# Patient Record
Sex: MALE | Race: BLACK OR AFRICAN AMERICAN | NOT HISPANIC OR LATINO | Employment: FULL TIME | URBAN - METROPOLITAN AREA
[De-identification: names, ages, dates, MRNs, and addresses within clinical notes are randomized per-mention and may not be internally consistent; named-entity substitution may affect disease eponyms.]

---

## 2017-02-06 ENCOUNTER — HOSPITAL ENCOUNTER (OUTPATIENT)
Dept: RADIOLOGY | Facility: HOSPITAL | Age: 57
Discharge: HOME | End: 2017-02-06
Payer: COMMERCIAL

## 2017-02-06 ENCOUNTER — TRANSCRIBE ORDERS (OUTPATIENT)
Dept: ADMINISTRATIVE | Facility: HOSPITAL | Age: 57
End: 2017-02-06

## 2017-02-06 DIAGNOSIS — I50.22 CHRONIC SYSTOLIC HEART FAILURE (HCC): Primary | ICD-10-CM

## 2017-02-06 PROCEDURE — 71020 HB CHEST X-RAY 2VW FRONTAL&LATL: CPT

## 2019-04-18 LAB — HCV AB SER-ACNC: NONREACTIVE

## 2022-01-17 ENCOUNTER — OFFICE VISIT (OUTPATIENT)
Dept: FAMILY MEDICINE CLINIC | Facility: CLINIC | Age: 62
End: 2022-01-17
Payer: COMMERCIAL

## 2022-01-17 VITALS
HEIGHT: 72 IN | WEIGHT: 194.4 LBS | TEMPERATURE: 97.7 F | BODY MASS INDEX: 26.33 KG/M2 | HEART RATE: 73 BPM | RESPIRATION RATE: 12 BRPM | DIASTOLIC BLOOD PRESSURE: 86 MMHG | OXYGEN SATURATION: 100 % | SYSTOLIC BLOOD PRESSURE: 120 MMHG

## 2022-01-17 DIAGNOSIS — Z13.1 SCREENING FOR DIABETES MELLITUS: ICD-10-CM

## 2022-01-17 DIAGNOSIS — R53.83 FATIGUE, UNSPECIFIED TYPE: ICD-10-CM

## 2022-01-17 DIAGNOSIS — N52.9 ERECTILE DYSFUNCTION, UNSPECIFIED ERECTILE DYSFUNCTION TYPE: ICD-10-CM

## 2022-01-17 DIAGNOSIS — Z12.12 SCREENING FOR COLORECTAL CANCER: ICD-10-CM

## 2022-01-17 DIAGNOSIS — J45.20 MILD INTERMITTENT ASTHMA WITHOUT COMPLICATION: ICD-10-CM

## 2022-01-17 DIAGNOSIS — Z12.11 SCREENING FOR COLORECTAL CANCER: ICD-10-CM

## 2022-01-17 DIAGNOSIS — Z13.6 SCREENING FOR CARDIOVASCULAR CONDITION: ICD-10-CM

## 2022-01-17 DIAGNOSIS — Z00.00 ANNUAL PHYSICAL EXAM: Primary | ICD-10-CM

## 2022-01-17 DIAGNOSIS — I50.9 CHRONIC CONGESTIVE HEART FAILURE, UNSPECIFIED HEART FAILURE TYPE (HCC): ICD-10-CM

## 2022-01-17 PROCEDURE — 3008F BODY MASS INDEX DOCD: CPT | Performed by: FAMILY MEDICINE

## 2022-01-17 PROCEDURE — 3725F SCREEN DEPRESSION PERFORMED: CPT | Performed by: FAMILY MEDICINE

## 2022-01-17 PROCEDURE — 99386 PREV VISIT NEW AGE 40-64: CPT | Performed by: FAMILY MEDICINE

## 2022-01-17 PROCEDURE — 1036F TOBACCO NON-USER: CPT | Performed by: FAMILY MEDICINE

## 2022-01-17 RX ORDER — GLUCOSAMINE HCL 500 MG
200 TABLET ORAL
COMMUNITY

## 2022-01-17 RX ORDER — CARVEDILOL 12.5 MG/1
TABLET ORAL 2 TIMES DAILY
COMMUNITY

## 2022-01-17 RX ORDER — TADALAFIL 20 MG/1
TABLET ORAL
COMMUNITY
Start: 2022-01-08

## 2022-01-17 RX ORDER — ASCORBIC ACID 500 MG
500 TABLET ORAL DAILY
COMMUNITY

## 2022-01-17 RX ORDER — SPIRONOLACTONE 25 MG/1
25 TABLET ORAL DAILY
COMMUNITY

## 2022-01-17 RX ORDER — DAPAGLIFLOZIN 10 MG/1
10 TABLET, FILM COATED ORAL DAILY
COMMUNITY

## 2022-01-17 RX ORDER — AMOXICILLIN 500 MG
1200 CAPSULE ORAL DAILY
COMMUNITY

## 2022-01-17 NOTE — PATIENT INSTRUCTIONS

## 2022-01-17 NOTE — PROGRESS NOTES
[address] [facility]    NAME: Burnis Telles  AGE: 64 y o  SEX: male  : 1960     DATE: 2022     Assessment and Plan:     Problem List Items Addressed This Visit     None      Visit Diagnoses     Annual physical exam    -  Primary    María Garcia appears well  He is to continue a lower carb/salt diet, and regular exercise  FBW ordered  Relevant Orders    TSH, 3rd generation with Free T4 reflex    CBC and differential    Screening for colorectal cancer        Pt referred to GI  Relevant Orders    Ambulatory Referral to Gastroenterology    Screening for diabetes mellitus        Relevant Orders    Comprehensive metabolic panel    Screening for cardiovascular condition        Relevant Orders    Lipid Panel with Direct LDL reflex    Fatigue, unspecified type        Relevant Orders    TSH, 3rd generation with Free T4 reflex    CBC and differential    Chronic congestive heart failure, unspecified heart failure type ([address] )        Hx of; stable on present management - appears euvolemic  Continues f/u with Cardiology as well  Relevant Medications    tadalafil (CIALIS) 20 MG tablet    carvedilol (COREG) 12 5 mg tablet    Mild intermittent asthma without complication        Stable  Has a PRN Albuterol HFA at home - rarely uses  Erectile dysfunction, unspecified erectile dysfunction type        Chronic, stable - continues f/u with Urology  Continues prostate cancer screening with them as well  Immunizations and preventive care screenings were discussed with patient today  Appropriate education was printed on patient's after visit summary  Counseling:  Dental Health: discussed importance of regular tooth brushing, flossing, and dental visits  · Exercise: the importance of regular exercise/physical activity was discussed  Recommend exercise 3-5 times per week for at least 30 minutes  BMI Counseling:  Body mass index is 26 37 kg/m²  The BMI is above normal  Nutrition recommendations include moderation in carbohydrate intake  Exercise recommendations include exercising 3-5 times per week  No pharmacotherapy was ordered  Patient referred to PCP  Rationale for BMI follow-up plan is due to patient being overweight or obese  Depression Screening and Follow-up Plan: Patient was screened for depression during today's encounter  They screened negative with a PHQ-2 score of 0  Return in 6 months (on 7/17/2022) for Recheck  Chief Complaint:     Chief Complaint   Patient presents with    Physical Exam     patient being seen for physical       History of Present Illness:     Adult Annual Physical   Patient here for a comprehensive physical exam  The patient reports no problems  Virgil Gallego presents today as a New Patient - his wife, John Goyal is also a pt of [employer]  Pt is followed by Cardiologist (CHF patient; hx of MV Clip) and Urology in in [place], [place]  Works for the [employer]  He is a non-smoker  Pt completed the full Moderna vaccine series (incl booster)  Declines the the Flu Vaccine  No hx of polyps, IBD; no family hx of colon cancer  Diet and Physical Activity  · Diet/Nutrition: well balanced diet  · Exercise: no formal exercise  Very active person  Depression Screening  PHQ-2/9 Depression Screening    Little interest or pleasure in doing things: 0 - not at all  Feeling down, depressed, or hopeless: 0 - not at all  PHQ-2 Score: 0  PHQ-2 Interpretation: Negative depression screen       General Health  · Sleep: sleeps well  · Hearing: normal - bilateral   · Vision: goes for regular eye exams  · Dental: no dental visits for >1 year  We discussed the importance of this   Health  · Symptoms include: none     Review of Systems:     Review of Systems   Constitutional: Negative for activity change  Respiratory: Negative for shortness of breath           Hx of mild, chronic LOPEZ  Cardiovascular: Negative for chest pain  Gastrointestinal: Negative for abdominal pain and blood in stool  Genitourinary: Negative for decreased urine volume and difficulty urinating  Hx of ED  Psychiatric/Behavioral: Negative for dysphoric mood  The patient is not nervous/anxious  Past Medical History:     History reviewed  No pertinent past medical history  Past Surgical History:     History reviewed  No pertinent surgical history  Family History:     History reviewed  No pertinent family history  Social History:     Social History     Socioeconomic History    Marital status: /Civil Union     Spouse name: None    Number of children: None    Years of education: None    Highest education level: None   Occupational History    None   Tobacco Use    Smoking status: Never Smoker    Smokeless tobacco: Never Used   Substance and Sexual Activity    Alcohol use:  Yes    Drug use: Not Currently    Sexual activity: None   Other Topics Concern    None   Social History Narrative    None     Social Determinants of Health     Financial Resource Strain: Not on file   Food Insecurity: Not on file   Transportation Needs: Not on file   Physical Activity: Not on file   Stress: Not on file   Social Connections: Not on file   Intimate Partner Violence: Not on file   Housing Stability: Not on file      Current Medications:     Current Outpatient Medications   Medication Sig Dispense Refill    apixaban (ELIQUIS) 5 mg Take 5 mg by mouth 2 (two) times a day      ascorbic acid (VITAMIN C) 500 MG tablet Take 500 mg by mouth daily      carvedilol (COREG) 12 5 mg tablet 2 (two) times a day      Coenzyme Q10 100 MG TABS Take 200 mg by mouth      Dapagliflozin Propanediol (Farxiga) 10 MG TABS Take 10 mg by mouth daily      multivitamin-minerals (CENTRUM) tablet Take 1 tablet by mouth daily      Omega-3 Fatty Acids (Fish Oil) 1200 MG CAPS Take 1,200 mg by mouth daily      rivaroxaban (Xarelto) 20 mg tablet Take 20 mg by mouth      spironolactone (ALDACTONE) 25 mg tablet Take 25 mg by mouth daily      tadalafil (CIALIS) 20 MG tablet TAKE 1 TABLET BY MOUTH IF NEEDED FOR ERECTILE DYSFUNCTION PRIOR TO SEX       No current facility-administered medications for this visit  Allergies:     No Known Allergies   Physical Exam:     /86 (BP Location: Left arm, Patient Position: Sitting, Cuff Size: Standard)   Pulse 73   Temp 97 7 °F (36 5 °C) (Tympanic)   Resp 12   Ht 6' (1 829 m)   Wt 88 2 kg (194 lb 6 4 oz)   SpO2 100%   BMI 26 37 kg/m²     Physical Exam  Vitals and nursing note reviewed  Constitutional:       General: He is not in acute distress  Appearance: Normal appearance  He is not ill-appearing, toxic-appearing or diaphoretic  HENT:      Head: Normocephalic and atraumatic  Eyes:      General: No scleral icterus  Conjunctiva/sclera: Conjunctivae normal    Neck:      Vascular: No JVD  Cardiovascular:      Rate and Rhythm: Normal rate and regular rhythm  Heart sounds: Normal heart sounds  No murmur heard  No friction rub  No gallop  Pulmonary:      Effort: Pulmonary effort is normal  No respiratory distress  Breath sounds: Normal breath sounds  No stridor  No wheezing, rhonchi or rales  Abdominal:      General: Abdomen is flat  Bowel sounds are normal  There is no distension  Palpations: Abdomen is soft  There is no mass  Tenderness: There is no abdominal tenderness  There is no guarding or rebound  Musculoskeletal:      Cervical back: Normal range of motion and neck supple  No rigidity or tenderness  Right lower leg: No edema  Left lower leg: No edema  Lymphadenopathy:      Cervical: No cervical adenopathy  Neurological:      Mental Status: He is alert and oriented to person, place, and time  Psychiatric:         Mood and Affect: Mood normal          Behavior: Behavior normal          Thought Content:  Thought content normal          Judgment: Judgment normal       Murray Carrel was seen today for physical exam     Diagnoses and all orders for this visit:    Annual physical exam  Comments:  Kristen Red appears well  He is to continue a lower carb/salt diet, and regular exercise  FBW ordered  Orders:  -     TSH, 3rd generation with Free T4 reflex; Future  -     CBC and differential; Future    Screening for colorectal cancer  Comments:  Pt referred to GI  Orders:  -     Ambulatory Referral to Gastroenterology; Future    Screening for diabetes mellitus  -     Comprehensive metabolic panel; Future    Screening for cardiovascular condition  -     Lipid Panel with Direct LDL reflex; Future    Fatigue, unspecified type  -     TSH, 3rd generation with Free T4 reflex; Future  -     CBC and differential; Future    Chronic congestive heart failure, unspecified heart failure type ([facility] [address] )  Comments:  Hx of; stable on present management - appears euvolemic  Continues f/u with Cardiology as well  Mild intermittent asthma without complication  Comments:  Stable  Has a PRN Albuterol HFA at home - rarely uses  Erectile dysfunction, unspecified erectile dysfunction type  Comments:  Chronic, stable - continues f/u with Urology  Continues prostate cancer screening with them as well            [address] [place], [address] [facility]

## 2022-05-26 ENCOUNTER — NURSE TRIAGE (OUTPATIENT)
Dept: OTHER | Facility: OTHER | Age: 62
End: 2022-05-26

## 2022-05-26 NOTE — TELEPHONE ENCOUNTER
Called the patient to verify virtual and needed the number but had to leave a message to call me back first thing in the am

## 2022-05-26 NOTE — TELEPHONE ENCOUNTER
Patient called in to report he tested positive for Covid today; reporting history of Asthma, CHF, Pacemaker/ICD  Using inhaler and has nebulizer  Reports shortness of breath while resting and wheezing  No speaking difficulty during triage call noted  Triage RN advised patient go to Urgent Care or ED; patient reports he will wait for virtual visit with MAAMENP at 11:30 AM 5/27/22  Will seek immediate care if symptoms worsen this evening  Please follow up with patient prior to visit to know how to connect with provider  Reason for Disposition   MILD difficulty breathing (e g , minimal/no SOB at rest, SOB with walking, pulse <100)    Answer Assessment - Initial Assessment Questions  Were you within 6 feet or less, for up to 15 minutes or more with a person that has a confirmed COVID-19 test? Yes, friend on 5/22/22    What was the date of your exposure? Home test positive 5/26/22    Are you experiencing any symptoms attributed to the virus?  (Assess for SOB, cough, fever, difficulty breathing) Cough, sore throat, runny nose, fever 99 8F, fatigue,     HIGH RISK: Do you have any history heart or lung conditions, weakened immune system, diabetes, Asthma, CHF, HIV, COPD, Chemo, renal failure, sickle cell, etc? CHF, Pacemaker/ICD; Asthma with inhaler PRN; shortness of breath while resting and wheezing    PREGNANCY: Are you pregnant or did you recently give birth?  N/a    VACCINE: "Have you gotten the COVID-19 vaccine?" If Yes ask: "Which one, how many shots, when did you get it?" Yes, Moderna x 2 shots plus booster    Protocols used: CORONAVIRUS (COVID-19) DIAGNOSED OR SUSPECTED-[facility]

## 2022-05-26 NOTE — TELEPHONE ENCOUNTER
Regarding: covid positive med request   ----- Message from Sirisha Ellsworth sent at 5/26/2022  4:14 PM EDT -----  " I tested positive for covid and Im wondering if I can be prescribed the covid medication ?"

## 2022-05-27 ENCOUNTER — HOSPITAL ENCOUNTER (OUTPATIENT)
Dept: INFUSION CENTER | Facility: HOSPITAL | Age: 62
Discharge: HOME | End: 2022-05-27
Payer: COMMERCIAL

## 2022-05-27 ENCOUNTER — TELEPHONE (OUTPATIENT)
Dept: OTHER | Facility: OTHER | Age: 62
End: 2022-05-27

## 2022-05-27 ENCOUNTER — TELEMEDICINE (OUTPATIENT)
Dept: FAMILY MEDICINE CLINIC | Facility: CLINIC | Age: 62
End: 2022-05-27
Payer: COMMERCIAL

## 2022-05-27 VITALS
HEART RATE: 75 BPM | RESPIRATION RATE: 18 BRPM | SYSTOLIC BLOOD PRESSURE: 119 MMHG | TEMPERATURE: 100.8 F | OXYGEN SATURATION: 98 % | DIASTOLIC BLOOD PRESSURE: 73 MMHG

## 2022-05-27 VITALS — WEIGHT: 190.48 LBS | HEIGHT: 75 IN | TEMPERATURE: 97.7 F | BODY MASS INDEX: 23.68 KG/M2

## 2022-05-27 DIAGNOSIS — J45.20 MILD INTERMITTENT ASTHMA WITHOUT COMPLICATION: Primary | ICD-10-CM

## 2022-05-27 DIAGNOSIS — J45.20 MILD INTERMITTENT ASTHMA WITHOUT COMPLICATION: ICD-10-CM

## 2022-05-27 DIAGNOSIS — U07.1 COVID-19: Primary | ICD-10-CM

## 2022-05-27 DIAGNOSIS — I10 HYPERTENSION, UNSPECIFIED TYPE: ICD-10-CM

## 2022-05-27 DIAGNOSIS — I50.9 CONGESTIVE HEART FAILURE, UNSPECIFIED HF CHRONICITY, UNSPECIFIED HEART FAILURE TYPE (HCC): ICD-10-CM

## 2022-05-27 PROBLEM — Z95.810 ICD (IMPLANTABLE CARDIOVERTER-DEFIBRILLATOR), BIVENTRICULAR, IN SITU: Status: ACTIVE | Noted: 2020-01-22

## 2022-05-27 PROBLEM — I34.0 SEVERE MITRAL REGURGITATION: Status: ACTIVE | Noted: 2020-01-22

## 2022-05-27 PROCEDURE — M0222 HB BEBTELOVIMAB INJECTION: HCPCS | Performed by: FAMILY MEDICINE

## 2022-05-27 PROCEDURE — 99213 OFFICE O/P EST LOW 20 MIN: CPT | Performed by: NURSE PRACTITIONER

## 2022-05-27 RX ORDER — LEVALBUTEROL TARTRATE 45 UG/1
1-2 AEROSOL, METERED ORAL EVERY 4 HOURS PRN
Qty: 15 G | Refills: 3 | Status: SHIPPED | OUTPATIENT
Start: 2022-05-27

## 2022-05-27 RX ORDER — ONDANSETRON 2 MG/ML
4 INJECTION INTRAMUSCULAR; INTRAVENOUS ONCE AS NEEDED
Status: CANCELLED | OUTPATIENT
Start: 2022-05-27

## 2022-05-27 RX ORDER — ALBUTEROL SULFATE 90 UG/1
3 AEROSOL, METERED RESPIRATORY (INHALATION) ONCE AS NEEDED
Status: CANCELLED | OUTPATIENT
Start: 2022-05-27

## 2022-05-27 RX ORDER — ACETAMINOPHEN 325 MG/1
650 TABLET ORAL ONCE AS NEEDED
Status: CANCELLED | OUTPATIENT
Start: 2022-05-27

## 2022-05-27 RX ORDER — BEBTELOVIMAB 87.5 MG/ML
175 INJECTION, SOLUTION INTRAVENOUS ONCE
Status: COMPLETED | OUTPATIENT
Start: 2022-05-27 | End: 2022-05-27

## 2022-05-27 RX ORDER — ALBUTEROL SULFATE 90 UG/1
3 AEROSOL, METERED RESPIRATORY (INHALATION) ONCE AS NEEDED
Status: DISCONTINUED | OUTPATIENT
Start: 2022-05-27 | End: 2022-05-30 | Stop reason: HOSPADM

## 2022-05-27 RX ORDER — SODIUM CHLORIDE 9 MG/ML
20 INJECTION, SOLUTION INTRAVENOUS ONCE
Status: CANCELLED | OUTPATIENT
Start: 2022-05-27

## 2022-05-27 RX ORDER — ACETAMINOPHEN 325 MG/1
650 TABLET ORAL ONCE AS NEEDED
Status: DISCONTINUED | OUTPATIENT
Start: 2022-05-27 | End: 2022-05-30 | Stop reason: HOSPADM

## 2022-05-27 RX ORDER — BEBTELOVIMAB 87.5 MG/ML
175 INJECTION, SOLUTION INTRAVENOUS ONCE
Status: CANCELLED | OUTPATIENT
Start: 2022-05-27

## 2022-05-27 RX ORDER — SODIUM CHLORIDE 9 MG/ML
20 INJECTION, SOLUTION INTRAVENOUS ONCE
Status: DISCONTINUED | OUTPATIENT
Start: 2022-05-27 | End: 2022-05-30 | Stop reason: HOSPADM

## 2022-05-27 RX ORDER — ONDANSETRON 2 MG/ML
4 INJECTION INTRAMUSCULAR; INTRAVENOUS ONCE AS NEEDED
Status: DISCONTINUED | OUTPATIENT
Start: 2022-05-27 | End: 2022-05-30 | Stop reason: HOSPADM

## 2022-05-27 RX ADMIN — BEBTELOVIMAB 175 MG: 87.5 INJECTION, SOLUTION INTRAVENOUS at 14:01

## 2022-05-27 RX ADMIN — ACETAMINOPHEN 650 MG: 325 TABLET, FILM COATED ORAL at 14:58

## 2022-05-27 NOTE — NURSING NOTE
Pt questioning make up of bebtelovimab, concerned if any blood products in make up  Calls placed to pharmacy and , ge, awaiting answers  Barcode for information also scanned by pt and rn

## 2022-05-27 NOTE — PROGRESS NOTES
COVID-19 Outpatient Progress Note    Assessment/Plan:    Problem List Items Addressed This Visit        Respiratory    Asthma    Relevant Medications    levalbuterol (Xopenex HFA) 45 mcg/act inhaler       Cardiovascular and Mediastinum    Hypertension    Congestive heart failure ([address] )       Other    COVID-19 - Primary       COVID-19 Home Care Guidelines    Your healthcare provider and/or public health staff have evaluated you and have determined that you do not need to remain in the hospital at this time  At this time you can be isolated at home where you will be monitored by staff from your local or state health department  You should carefully follow the prevention and isolation steps below until a healthcare provider or local or state health department says that you can return to your normal activities  Stay home except to get medical care    People who are mildly ill with COVID-19 are able to isolate at home during their illness  You should restrict activities outside your home, except for getting medical care  Do not go to work, school, or public areas  Avoid using public transportation, ride-sharing, or taxis  Separate yourself from other people and animals in your home    People: As much as possible, you should stay in a specific room and away from other people in your home  Also, you should use a separate bathroom, if available  Animals: You should restrict contact with pets and other animals while you are sick with COVID-19, just like you would around other people  Although there have not been reports of pets or other animals becoming sick with COVID-19, it is still recommended that people sick with COVID-19 limit contact with animals until more information is known about the virus  When possible, have another member of your household care for your animals while you are sick   If you are sick with COVID-19, avoid contact with your pet, including petting, snuggling, being kissed or licked, and sharing food  If you must care for your pet or be around animals while you are sick, wash your hands before and after you interact with pets and wear a facemask  See COVID-19 and Animals for more information  Call ahead before visiting your doctor    If you have a medical appointment, call the healthcare provider and tell them that you have or may have COVID-19  This will help the healthcare providers office take steps to keep other people from getting infected or exposed  Wear a facemask    You should wear a facemask when you are around other people (e g , sharing a room or vehicle) or pets and before you enter a healthcare providers office  If you are not able to wear a facemask (for example, because it causes trouble breathing), then people who live with you should not stay in the same room with you, or they should wear a facemask if they enter your room  Cover your coughs and sneezes    Cover your mouth and nose with a tissue when you cough or sneeze  Throw used tissues in a lined trash can  Immediately wash your hands with soap and water for at least 20 seconds or, if soap and water are not available, clean your hands with an alcohol-based hand  that contains at least 60% alcohol  Clean your hands often    Wash your hands often with soap and water for at least 20 seconds, especially after blowing your nose, coughing, or sneezing; going to the bathroom; and before eating or preparing food  If soap and water are not readily available, use an alcohol-based hand  with at least 60% alcohol, covering all surfaces of your hands and rubbing them together until they feel dry  Soap and water are the best option if hands are visibly dirty  Avoid touching your eyes, nose, and mouth with unwashed hands  Avoid sharing personal household items    You should not share dishes, drinking glasses, cups, eating utensils, towels, or bedding with other people or pets in your home   After using these items, they should be washed thoroughly with soap and water  Clean all high-touch surfaces everyday    High touch surfaces include counters, tabletops, doorknobs, bathroom fixtures, toilets, phones, keyboards, tablets, and bedside tables  Also, clean any surfaces that may have blood, stool, or body fluids on them  Use a household cleaning spray or wipe, according to the label instructions  Labels contain instructions for safe and effective use of the cleaning product including precautions you should take when applying the product, such as wearing gloves and making sure you have good ventilation during use of the product  Monitor your symptoms    Seek prompt medical attention if your illness is worsening (e g , difficulty breathing)  Before seeking care, call your healthcare provider and tell them that you have, or are being evaluated for, COVID-19  Put on a facemask before you enter the facility  These steps will help the healthcare providers office to keep other people in the office or waiting room from getting infected or exposed  Ask your healthcare provider to call the local or [facility] health department  Persons who are placed under active monitoring or facilitated self-monitoring should follow instructions provided by their local health department or occupational health professionals, as appropriate  If you have a medical emergency and need to call 911, notify the dispatch personnel that you have, or are being evaluated for COVID-19  If possible, put on a facemask before emergency medical services arrive  Discontinuing home isolation    Patients with confirmed COVID-19 should remain under home isolation precautions until the following conditions are met:    They have had no fever for at least 24 hours (that is one full day of no fever without the use medicine that reduces fevers)  AND  other symptoms have improved (for example, when their cough or shortness of breath have improved)  AND  If had mild or moderate illness, at least 10 days have passed since their symptoms first appeared or if severe illness (needed oxygen) or immunosuppressed, at least 20 days have passed since symptoms first appeared  Patients with confirmed COVID-19 should also notify close contacts (including their workplace) and ask that they self-quarantine  Currently, close contact is defined as being within 6 feet for 15 minutes or more from the period 24 hours starting 48 hours before symptom onset to the time at which the patient went into isolation  Close contacts of patients diagnosed with COVID-19 should be instructed by the patient to self-quarantine for 14 days from the last time of their last contact with the patient  Source: [employer]  Disposition:     Patient is not fully vaccinated and I recommended self quarantine for 5 days followed by strict mask use for an additional 5 days  If patient were to develop symptoms, they should immediately self isolate and call our office for further guidance  Discussed vitamin D, vitamin C, and/or zinc supplementation with patient  Patient meets criteria for Bebtelovimab infusion  They were counseled in regards to risks, benefits, and side effects of this infusion  Corrinne Jaime is an investigational medicine used to treat mild-to-moderate symptoms of COVID-19 in adults and children (15years of age and older weighing at least 80 pounds (40 kg)) with positive results of direct SARS-CoV-2 viral testing, and who are at high risk of progression to severe COVID-19, including hospitalization or death, and for whom other COVID-19 treatment options approved or authorized by FDA are not available or clinically appropriate  Bebtelovimab is investigational because it is still being studied   There is limited information about the safety and effectiveness of using bebtelovimab to treat people with mild-to-moderate COVID-19  The FDA has authorized the emergency use of bebtelovimab for the treatment of COVID-19 under an Emergency Use Authorization (EUA)  Ezzie Camejo is not authorized for use in people who:  - are likely to be infected with a SARS-CoV-2 variant that is not able to be treated by bebtelovimab based on the circulating variants in your area (ask your health care provider about FDA and CDCs latest information on circulating variants by geographic area), or  - are hospitalized due to COVID-19, or  - require oxygen therapy and/or respiratory support due to COVID-19, or  - require an increase in baseline oxygen flow rate and/or respiratory support due to [address] and are on chronic oxygen therapy and/or respiratory support due to underlying nonCOVID-19 related comorbidity  How will I receive Bebtelovimab? Ezzie Camejo will be given as an injection through a vein (intravenously or IV) over at least 30 seconds  You will be observed by your healthcare provider for at least 1 hour after you receive bebtelovimab  Possible side effects of Bebtelovimab: Allergic reactions can happen during and after infusion with bebtelovimab  Possible reactions include: fever, chills, nausea, headache, shortness of breath, low or high blood pressure, rapid or slow heart rate, chest discomfort or pain, weakness, confusion, feeling tired, wheezing, swelling of your lips, face, or throat, rash including hives, itching, muscle aches, dizziness, and sweating  These reactions may be severe or life threatening  Worsening symptoms after treatment: You may experience new or worsening symptoms after infusion, including fever, difficulty breathing, rapid or slow heart rate, tiredness, weakness or confusion  If these occur, contact your healthcare provider or seek immediate medical attention as some of these events have required hospitalization   It is unknown if these events are related to treatment or are due to the progression of COVID19  The side effects of getting any medicine by vein may include brief pain, bleeding, bruising of the skin, soreness, swelling, and possible infection at the infusion site  These are not all the possible side effects of bebtelovimab  Not a lot of people have been given bebtelovimab  Serious and unexpected side effects may happen  Bebtelovimab are still being studied so it is possible that all of the risks are not known at this time  It is possible that bebtelovimab could interfere with your body's own ability to fight off a future infection of SARS-CoV-2  Similarly, bebtelovimab may reduce your bodys immune response to a vaccine for SARS-CoV-2  Specific studies have not been conducted to address these possible risks  Talk to your healthcare provider if you have any questions  Emergency Use Authorization:    The [facility] FDA has made bebtelovimab available under an emergency access mechanism called an EUA  The EUA is supported by a  of Health and Human Service ([facility]) declaration that circumstances exist to justify the emergency use of drugs and biological products during the COVID-19 pandemic  Bebtelovimab have not undergone the same type of review as an FDA-approved or cleared product  The FDA may issue an EUA when certain criteria are met, which includes that there are no adequate, approved, and available alternatives  In addition, the FDA decision is based on the totality of scientific evidence available showing that it is reasonable to believe that the product meets certain criteria for safety, performance, and labeling and may be effective in treatment of patients during the COVID-19 pandemic  All of these criteria must be met to allow for the product to be used in the treatment of patients during the COVID-19 pandemic      The EUA for bebtelovimab together is in effect for the duration of the COVID-19 declaration justifying emergency use of these products, unless terminated or revoked (after which the product may no longer be used)  What if I am pregnant or breastfeeding? There is no experience treating pregnant women or breastfeeding mothers with bebtelovimab  For a mother and unborn baby, the benefit of receiving bebtelovimab may be greater than the risk from the treatment  If you are pregnant or breastfeeding, discuss your options and specific situation with your healthcare provider  How do I report side effects with Bebtelovimab? Contact your healthcare provider if you have any side effects that bother you or do not go away  Report side effects to FDA MedWatch at www [employer] gov/medwatch, or call 8-317-UDU-8299 or to [address] [place] [address]  as shown below  Email: Scott@[employer]   Fax number: 4-945.398.7004   Telephone number: 5-813-QDLSZR46 ([phone])    Full fact sheet document for patients can be found at: [email or web address]/    The patient consents to proceed with bebtelovimab infusion  I have spent 15 minutes directly with the patient  Greater than 50% of this time was spent in counseling/coordination of care regarding: diagnostic results, prognosis, risks and benefits of treatment options, instructions for management, patient and family education, importance of treatment compliance, risk factor reductions and impressions  Encounter provider CHRIS Iniguez    Provider located at [address] [place] [phone]    Recent Visits  No visits were found meeting these conditions  Showing recent visits within past 7 days and meeting all other requirements  Today's Visits  Date Type Provider Dept   05/27/22 Telemedicine CHRIS Iniguez  Henry Morales   Showing today's visits and meeting all other requirements  Future Appointments  No visits were found meeting these conditions    Showing future appointments within next 150 days and meeting all other requirements     This virtual check-in was done via [address] and patient was informed that this is a secure, HIPAA-compliant platform  He agrees to proceed  Patient agrees to participate in a virtual check in via telephone or video visit instead of presenting to the office to address urgent/immediate medical needs  Patient is aware this is a billable service  After connecting through [facility], the patient was identified by name and date of birth  Maude Donovan was informed that this was a telemedicine visit and that the exam was being conducted confidentially over secure lines  My office door was closed  No one else was in the room  Maude Donovan acknowledged consent and understanding of privacy and security of the telemedicine visit  I informed the patient that I have reviewed his record in Epic and presented the opportunity for him to ask any questions regarding the visit today  The patient agreed to participate  Verification of patient location:  Patient is located in the following state in which I hold an active license: [place]    Subjective:   Maude Donovan is a 58 y o  male who is concerned about COVID-19  Patient's symptoms include chills, fatigue, nasal congestion, rhinorrhea, cough, shortness of breath, chest tightness and headache  Patient denies fever, malaise, sore throat, anosmia, loss of taste, abdominal pain, nausea, vomiting, diarrhea and myalgias       - Date of symptom onset: 5/25/2022      COVID-19 vaccination status: Not vaccinated    Exposure:   Contact with a person who is under investigation (PUI) for or who is positive for COVID-19 within the last 14 days?: Yes    Hospitalized recently for fever and/or lower respiratory symptoms?: No      Currently a healthcare worker that is involved in direct patient care?: No      Works in a special setting where the risk of COVID-19 transmission may be high? (this may include long-term care, correctional and [facility] facilities; homeless shelters; assisted-living facilities and group homes ): No      Resident in a special setting where the risk of COVID-19 transmission may be high? 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  2 weeks ago tested and it was negative  Taking vit c   sambucu  Using cough drops  Breathing has improved a lot from yesterday to today      No results found for: Julieta Payne, [address] [facility], [address] [facility], [facility], [facility], [facility]  History reviewed  No pertinent past medical history  History reviewed  No pertinent surgical history    Current Outpatient Medications   Medication Sig Dispense Refill    ascorbic acid (VITAMIN C) 500 MG tablet Take 500 mg by mouth daily      carvedilol (COREG) 12 5 mg tablet 2 (two) times a day      Coenzyme Q10 100 MG TABS Take 200 mg by mouth      Dapagliflozin Propanediol (Farxiga) 10 MG TABS Take 10 mg by mouth daily      levalbuterol (Xopenex HFA) 45 mcg/act inhaler Inhale 1-2 puffs every 4 (four) hours as needed for wheezing 15 g 3    multivitamin-minerals (CENTRUM) tablet Take 1 tablet by mouth daily      Omega-3 Fatty Acids (Fish Oil) 1200 MG CAPS Take 1,200 mg by mouth daily      rivaroxaban (XARELTO) 20 mg tablet Take 20 mg by mouth      spironolactone (ALDACTONE) 25 mg tablet Take 25 mg by mouth daily      tadalafil (CIALIS) 20 MG tablet TAKE 1 TABLET BY MOUTH IF NEEDED FOR ERECTILE DYSFUNCTION PRIOR TO SEX      apixaban (ELIQUIS) 5 mg Take 5 mg by mouth 2 (two) times a day (Patient not taking: Reported on 5/27/2022)       No current facility-administered medications for this visit  Allergies   Allergen Reactions    Molds & Smuts Shortness Of Breath     Reaction Date: 03Jul2006;     Cat Hair Extract Other (See Comments)     Reaction Date: 03Jul2006;     Shellfish Allergy - Food Allergy GI Intolerance       Review of Systems   Constitutional: Positive for chills and fatigue  Negative for fever  HENT: Positive for congestion, postnasal drip and rhinorrhea  Negative for ear pain and sore throat  Eyes: Negative for photophobia and visual disturbance  Respiratory: Positive for cough, chest tightness and shortness of breath  Cardiovascular: Negative for chest pain and palpitations  Gastrointestinal: Negative for abdominal pain, diarrhea, nausea and vomiting  Genitourinary: Negative for dysuria and frequency  Musculoskeletal: Negative for arthralgias and myalgias  Skin: Negative for rash  Neurological: Positive for headaches  Negative for dizziness and light-headedness  Hematological: Negative for adenopathy  Psychiatric/Behavioral: Negative for dysphoric mood and sleep disturbance  The patient is not nervous/anxious  Objective:    Vitals:    05/27/22 1113   Temp: 97 7 °F (36 5 °C)   Weight: 86 4 kg (190 lb 7 6 oz)   Height: 6' 3" (1 905 m)       Physical Exam  Constitutional:       Appearance: Normal appearance  HENT:      Head: Normocephalic and atraumatic  Eyes:      Conjunctiva/sclera: Conjunctivae normal    Pulmonary:      Effort: Pulmonary effort is normal    Musculoskeletal:         General: Normal range of motion  Cervical back: Normal range of motion  Neurological:      Mental Status: He is alert and oriented to person, place, and time  Psychiatric:         Mood and Affect: Mood normal          Behavior: Behavior normal          VIRTUAL VISIT DISCLAIMER    Rodri Persaud verbally agrees to participate in [employer]   Pt is aware that [employer] could be limited without vital signs or the ability to perform a full hands-on physical exam  Sukhdeep Portillo understands he or the provider may request at any time to terminate the video visit and request the patient to seek care or treatment in person

## 2022-05-27 NOTE — NURSING NOTE
Pt  ambulatory to treatment area with RN for bebletolimab infusion  EUA reviewed and copy given to patient  Verbal consent to proceed with treatment obtained   Positive home covid test visualized on arrival to treatment area

## 2022-05-27 NOTE — NURSING NOTE
Call received from [facility], confirms nbo blood products in medication, pt aware

## 2022-05-27 NOTE — NURSING NOTE
Date of Service: 5/22/2018    PROCEDURE: Esophagogastroduodenoscopy with placement of a Hemoclip    PREPROCEDURE DIAGNOSIS:   Anemia secondary to acute GI blood loss    POSTPROCEDURE DIAGNOSIS:   Severe erosive esophagitis without any stigmata of bleeding  Superficial ulceration with a red pigmented spot adjacent to the gastrostomy tube. No active bleeding. Hemoclip was placed.    ANESTHESIA:   0.5 mg of Versed. Please see nurse's notes for details     Scope Events:   Scope In: 1555        Scope Out: 1559              BRIEF CLINICAL HISTORY AND OPERATIVE PROCEDURE:     Addis Miller is a 76 year old female with history of anemia secondary acute GI blood loss. Endoscopy was performed for further assessment.    Operative procedure, diagnostic and therapeutic limitations, sedation, risks, benefits, alternatives were explained to the patient's  was the power of  for healthcare and informed consent was obtained. Possibility of missing a neoplastic process was explained as well.     Esophagus was intubated and the scope was advanced examining the esophageal mucosa, there was evidence of Boyd class D erosive esophagitis without any stigmata of bleeding. Subtle luminal narrowing was seen however scope couldn't be advanced readily into the stomach. No obvious strictures. In the stomach adjacent to the gastrostomy tube internal bumper there was a superficial ulceration with a red pigmented spot. No active bleeding was seen. Scope was advanced further mucosa in the antrum of the stomach was normal. Mucosa in bulb, second portion the duodenum was normal. No stigmata of any bleeding. Retroflexion the stomach revealed normal annularis, gastric cardia and fundus. Given patient's history of acute GI blood loss a single Hemoclip was placed over the ulceration. Scope was withdrawn back. No bleeding was seen from the erosive esophagitis.    The patient tolerated the procedure well. No immediate  Patient given tylenol 650 mh po for fever      Pt stable at time of discharge  Vital signs with in normal limits  Aware that they should have a follow up with their physician with in 24 hours  Copy of discharge instructions given to pt  postprocedural complications.     DISPOSITION:   To recover per routine postprocedure protocol.     RECOMMENDATIONS:  Resume tube feeding  B.i.d. PPI therapy  Keep head end of the bed elevated at 30/8/40 degrees at all times to minimize reflux and help heal erosive esophagitis. Alternate plan would be to have intervention radiology extend the current G-tube to a GJ tube to minimize reflux gastric contents to help aid erosive esophagitis and minimize the risk of bleeding.  Monitor hemoglobin and transfuse as needed.    Repeat examination as needed    Marjan Sierra MD, NORMAN SALAZAR

## 2022-05-27 NOTE — TELEPHONE ENCOUNTER
Phong Westbrook is calling because he is- waiting for Corie Ann to call for his virtual visit [phone]

## 2022-05-30 ENCOUNTER — TELEPHONE (OUTPATIENT)
Dept: OTHER | Facility: OTHER | Age: 62
End: 2022-05-30

## 2022-05-30 NOTE — TELEPHONE ENCOUNTER
Patient had the MAB infusion on Friday, and would like to follow up with a nurse to discuss symptoms  Patient will also require a letter for work to cover his time in quarantine with Covid-19

## 2022-05-31 ENCOUNTER — TELEMEDICINE (OUTPATIENT)
Dept: FAMILY MEDICINE CLINIC | Facility: CLINIC | Age: 62
End: 2022-05-31
Payer: COMMERCIAL

## 2022-05-31 VITALS — TEMPERATURE: 97.6 F

## 2022-05-31 DIAGNOSIS — U07.1 COVID-19: Primary | ICD-10-CM

## 2022-05-31 PROCEDURE — 99213 OFFICE O/P EST LOW 20 MIN: CPT | Performed by: FAMILY MEDICINE

## 2022-05-31 RX ORDER — SACUBITRIL AND VALSARTAN 49; 51 MG/1; MG/1
1 TABLET, FILM COATED ORAL 2 TIMES DAILY
COMMUNITY

## 2022-05-31 RX ORDER — CARVEDILOL PHOSPHATE 40 MG/1
40 CAPSULE, EXTENDED RELEASE ORAL DAILY
COMMUNITY

## 2022-05-31 NOTE — PROGRESS NOTES
COVID-19 Outpatient Progress Note    Assessment/Plan:    Problem List Items Addressed This Visit        Other    COVID-19 - Primary         Disposition:     Patient has COVID-19 infection  Based off CDC guidelines, they were recommended to isolate for 5 days from the date of the positive test  If they remain asymptomatic, isolation may be ended followed by 5 days of wearing a mask when around othes to minimize risk of infecting others  If they have a fever, continue to stay home until fever resolves for at least 24 hours  I have spent 15 minutes directly with the patient  Greater than 50% of this time was spent in counseling/coordination of care regarding: prognosis, risks and benefits of treatment options, instructions for management, patient and family education, importance of treatment compliance, risk factor reductions and impressions  Encounter provider Augustin Hooks DO    Provider located at [place] [id number] [facility] [id number]    Recent Visits  Date Type Provider Dept   05/27/22 Telemedicine CHRIS Che Pg   Showing recent visits within past 7 days and meeting all other requirements  Today's Visits  Date Type Provider Dept   05/31/22 Telemedicine DO Peterson Kinney   Showing today's visits and meeting all other requirements  Future Appointments  No visits were found meeting these conditions  Showing future appointments within next 150 days and meeting all other requirements       Patient agrees to participate in a virtual check in via telephone or video visit instead of presenting to the office to address urgent/immediate medical needs  Patient is aware this is a billable service  After connecting through [facility], the patient was identified by name and date of birth   Declan Flores was informed that this was a telemedicine visit and that the exam was being conducted confidentially over secure lines  My office door was closed  No one else was in the room  Shira Johnson acknowledged consent and understanding of privacy and security of the telemedicine visit  I informed the patient that I have reviewed his record in Epic and presented the opportunity for him to ask any questions regarding the visit today  The patient agreed to participate  Verification of patient location:  Patient is located in the following state in which I hold an active license: [place]    Subjective:   Shira Johnson is a 58 y o  male who has been screened for COVID-19  Symptom change since last report: improving  Patient's symptoms include cough  Patient denies fever, sore throat, shortness of breath and diarrhea  - Date of symptom onset: 5/25/2022      COVID-19 vaccination status: Fully vaccinated with booster    Krista Paget has been staying home and has isolated themselves in his home  He is taking care to not share personal items and is cleaning all surfaces that are touched often, like counters, tabletops, and doorknobs using household cleaning sprays or wipes  He is wearing a mask when he leaves his room  Monoclonal Antibody Follow-up Symptom Questionnaire  I feel overall: much better  My breathing is: much better  My fever is: better  My fatigue is: much better    Completed MAB on 5/27/22 -  Started doing much better over the weekend  Much less cough  No results found for: Matt Clacny, [address] [facility], [address] [facility], [facility] [address], [address] [facility], [address] [facility]  History reviewed  No pertinent past medical history  History reviewed  No pertinent surgical history    Current Outpatient Medications   Medication Sig Dispense Refill    ascorbic acid (VITAMIN C) 500 MG tablet Take 500 mg by mouth daily      ASPIRIN 81 PO Take 1 tablet by mouth in the morning      carvedilol (COREG CR) 40 MG 24 hr capsule Take 40 mg by mouth daily      Coenzyme Q10 100 MG TABS Take 200 mg by mouth      Dapagliflozin Propanediol (Farxiga) 10 MG TABS Take 10 mg by mouth daily      levalbuterol (Xopenex HFA) 45 mcg/act inhaler Inhale 1-2 puffs every 4 (four) hours as needed for wheezing 15 g 3    multivitamin-minerals (CENTRUM) tablet Take 1 tablet by mouth daily      Omega-3 Fatty Acids (Fish Oil) 1200 MG CAPS Take 1,200 mg by mouth daily      rivaroxaban (XARELTO) 20 mg tablet Take 20 mg by mouth      sacubitril-valsartan (Entresto) 49-51 MG TABS Take 1 tablet by mouth 2 (two) times a day      spironolactone (ALDACTONE) 25 mg tablet Take 25 mg by mouth daily      tadalafil (CIALIS) 20 MG tablet TAKE 1 TABLET BY MOUTH IF NEEDED FOR ERECTILE DYSFUNCTION PRIOR TO SEX      apixaban (ELIQUIS) 5 mg Take 5 mg by mouth 2 (two) times a day (Patient not taking: No sig reported)      carvedilol (COREG) 12 5 mg tablet 2 (two) times a day (Patient not taking: Reported on 5/31/2022)       No current facility-administered medications for this visit  Allergies   Allergen Reactions    Molds & Smuts Shortness Of Breath     Reaction Date: 03Jul2006;     Cat Hair Extract Other (See Comments)     Reaction Date: 03Jul2006;     Shellfish Allergy - Food Allergy GI Intolerance       Review of Systems   Constitutional: Negative for fever  HENT: Negative for sore throat  Respiratory: Positive for cough  Negative for shortness of breath  Gastrointestinal: Negative for diarrhea  Objective:    Vitals:    05/31/22 1540   Temp: 97 6 °F (36 4 °C)   TempSrc: Axillary       Physical Exam  Vitals reviewed  Constitutional:       General: He is not in acute distress  Appearance: Normal appearance  He is not ill-appearing, toxic-appearing or diaphoretic  HENT:      Head: Normocephalic and atraumatic  Eyes:      General: No scleral icterus  Conjunctiva/sclera: Conjunctivae normal    Pulmonary:      Effort: Pulmonary effort is normal  No respiratory distress  Neurological:      Mental Status: He is alert and oriented to person, place, and time  Psychiatric:         Mood and Affect: Mood normal          Behavior: Behavior normal          Thought Content: Thought content normal          Judgment: Judgment normal          Oscar Dent was seen today for covid-19 and covid-19  Diagnoses and all orders for this visit:    COVID-19  Comments:  Pt is doing well, & cleared from self-isolation  Continue mask-wearing in public, slowly return to activities, etc  Precautions given  Work note  VIRTUAL VISIT DISCLAIMER    Teresa Whitt verbally agrees to participate in [employer]  Pt is aware that [employer] could be limited without vital signs or the ability to perform a full hands-on physical Katie Soria understands he or the provider may request at any time to terminate the video visit and request the patient to seek care or treatment in person

## 2022-07-18 ENCOUNTER — OFFICE VISIT (OUTPATIENT)
Dept: FAMILY MEDICINE CLINIC | Facility: CLINIC | Age: 62
End: 2022-07-18
Payer: COMMERCIAL

## 2022-07-18 VITALS
DIASTOLIC BLOOD PRESSURE: 76 MMHG | TEMPERATURE: 97.5 F | HEIGHT: 75 IN | WEIGHT: 188.2 LBS | OXYGEN SATURATION: 99 % | HEART RATE: 66 BPM | SYSTOLIC BLOOD PRESSURE: 108 MMHG | BODY MASS INDEX: 23.4 KG/M2 | RESPIRATION RATE: 14 BRPM

## 2022-07-18 DIAGNOSIS — R53.83 FATIGUE, UNSPECIFIED TYPE: ICD-10-CM

## 2022-07-18 DIAGNOSIS — Z95.810 ICD (IMPLANTABLE CARDIOVERTER-DEFIBRILLATOR), BIVENTRICULAR, IN SITU: ICD-10-CM

## 2022-07-18 DIAGNOSIS — Z13.1 SCREENING FOR DIABETES MELLITUS: ICD-10-CM

## 2022-07-18 DIAGNOSIS — Z13.6 SCREENING FOR CARDIOVASCULAR CONDITION: ICD-10-CM

## 2022-07-18 DIAGNOSIS — J45.20 MILD INTERMITTENT ASTHMA WITHOUT COMPLICATION: ICD-10-CM

## 2022-07-18 DIAGNOSIS — I50.9 CONGESTIVE HEART FAILURE, UNSPECIFIED HF CHRONICITY, UNSPECIFIED HEART FAILURE TYPE (HCC): ICD-10-CM

## 2022-07-18 DIAGNOSIS — I42.0 DILATED CARDIOMYOPATHY (HCC): Primary | ICD-10-CM

## 2022-07-18 PROCEDURE — 3725F SCREEN DEPRESSION PERFORMED: CPT | Performed by: FAMILY MEDICINE

## 2022-07-18 PROCEDURE — 99213 OFFICE O/P EST LOW 20 MIN: CPT | Performed by: FAMILY MEDICINE

## 2022-07-18 NOTE — PROGRESS NOTES
FAMILY PRACTICE OFFICE VISIT       NAME: Candido Moreno  AGE: 58 y o  SEX: male       : 1960        MRN: [id number]    DATE: 2022  TIME: 10:14 AM    Assessment and Plan   1  Dilated cardiomyopathy ([email or web address] 75 )  Comments:  [facility] appears well, euvolemic  Continue lower salt diet, exercise, and present meds Gwendel Acre, Daisy Gibson, Xarelto, etc)  Continues close f/u with Cardiology  2  Congestive heart failure, unspecified HF chronicity, unspecified heart failure type ([facility] [[email or web address] 75 )  Comments:  Stable; as above  FBW re-ordered for the pt today  3  ICD (implantable cardioverter-defibrillator), biventricular, in situ  Comments:  Stable; as above  4  Mild intermittent asthma without complication  Comments:  Has been stable on present management  5  Screening for diabetes mellitus  -     Comprehensive metabolic panel; Future    6  Fatigue, unspecified type  -     CBC and differential; Future  -     TSH, 3rd generation with Free T4 reflex; Future    7  Screening for cardiovascular condition  -     Lipid Panel with Direct LDL reflex; Future         There are no Patient Instructions on file for this visit  Chief Complaint     Chief Complaint   Patient presents with    Follow-up     Patient being seen for 6 month follow up        History of Present Illness   Candido Christianson is a 58y o -year-old male who presents in f/u  Pt recovered from COV-19 at the end of 2022  No residual effect  Continues f/u with Cardiology and Urology out of the network  [facility] remains very active, and eats healthy  Previously ordered FBW not done yet  Review of Systems   Review of Systems   Constitutional: Negative for activity change  Respiratory: Negative for shortness of breath and wheezing  Cardiovascular: Negative for chest pain  Gastrointestinal: Negative for abdominal pain  Genitourinary: Negative for decreased urine volume, difficulty urinating and dysuria         Active Problem List     Patient Active Problem List   Diagnosis    Allergic rhinitis    Asthma    Dilated cardiomyopathy ([facility] )    Congestive heart failure ([facility] )    Hypertension    ICD (implantable cardioverter-defibrillator), biventricular, in situ    Severe mitral regurgitation    Mild intermittent asthma without complication    [id number]         Past Medical History:  History reviewed  No pertinent past medical history  Past Surgical History:  Past Surgical History:   Procedure Laterality Date    CARDIAC PACEMAKER PLACEMENT      MITRAL VALVULOPLASTY      OTHER SURGICAL HISTORY      Groin surgery       Family History:  Family History   Problem Relation Age of Onset    Diabetes Mother     Hypertension Mother     Heart failure Mother     Diabetes Father     Hypertension Father        Social History:  Social History     Socioeconomic History    Marital status: /Civil Union     Spouse name: Not on file    Number of children: Not on file    Years of education: Not on file    Highest education level: Not on file   Occupational History    Not on file   Tobacco Use    Smoking status: Never Smoker    Smokeless tobacco: Never Used   Vaping Use    Vaping Use: Never used   Substance and Sexual Activity    Alcohol use:  Yes    Drug use: Not Currently    Sexual activity: Not on file   Other Topics Concern    Not on file   Social History Narrative    Not on file     Social Determinants of Health     Financial Resource Strain: Not on file   Food Insecurity: Not on file   Transportation Needs: Not on file   Physical Activity: Not on file   Stress: Not on file   Social Connections: Not on file   Intimate Partner Violence: Not on file   Housing Stability: Not on file       Objective     Vitals:    07/18/22 0944   BP: 108/76   Pulse: 66   Resp: 14   Temp: 97 5 °F (36 4 °C)   SpO2: 99%     Wt Readings from Last 3 Encounters:   07/18/22 85 4 kg (188 lb 3 2 oz)   05/27/22 86 4 kg (190 lb 7 6 oz)   01/17/22 88 2 kg (194 lb 6 4 oz) Physical Exam  Vitals and nursing note reviewed  Constitutional:       General: He is not in acute distress  Appearance: Normal appearance  He is not ill-appearing, toxic-appearing or diaphoretic  HENT:      Head: Normocephalic and atraumatic  Eyes:      General: No scleral icterus  Conjunctiva/sclera: Conjunctivae normal    Cardiovascular:      Rate and Rhythm: Normal rate and regular rhythm  Heart sounds: Normal heart sounds  No murmur heard  No friction rub  No gallop  Pulmonary:      Effort: Pulmonary effort is normal  No respiratory distress  Breath sounds: Normal breath sounds  No stridor  No wheezing, rhonchi or rales  Musculoskeletal:      Cervical back: Normal range of motion and neck supple  No rigidity or tenderness  Lymphadenopathy:      Cervical: No cervical adenopathy  Neurological:      Mental Status: He is alert and oriented to person, place, and time  Psychiatric:         Mood and Affect: Mood normal          Behavior: Behavior normal          Thought Content:  Thought content normal          Judgment: Judgment normal          Pertinent Laboratory/Diagnostic Studies:  Lab Results   Component Value Date    GLUCOSE 86 10/16/2015    BUN 13 10/16/2015    CREATININE 1 0 10/16/2015    CALCIUM 8 4 10/16/2015     10/16/2015    K 3 8 10/16/2015    CO2 31 (H) 10/16/2015     10/16/2015     Lab Results   Component Value Date    ALT 17 10/15/2015    AST 20 10/15/2015    ALKPHOS 39 (L) 10/15/2015    BILITOT 0 4 10/15/2015       No results found for: WBC, HGB, HCT, MCV, PLT    No results found for: TSH    Lab Results   Component Value Date    CHOL 146 10/16/2015     Lab Results   Component Value Date    TRIG 15 10/16/2015     Lab Results   Component Value Date    HDL 68 (H) 10/16/2015     Lab Results   Component Value Date    LDLCALC 75 10/16/2015     No results found for: HGBA1C    Results for orders placed or performed in visit on 10/16/15   Culture, Surveillance (Historical)   Result Value Ref Range    Culture, Surveillance SEE BELOW        Orders Placed This Encounter   Procedures    Comprehensive metabolic panel    Lipid Panel with Direct LDL reflex    CBC and differential    TSH, 3rd generation with Free T4 reflex       ALLERGIES:  Allergies   Allergen Reactions    Molds & Smuts Shortness Of Breath     Reaction Date: 03Jul2006;     Cat Hair Extract Other (See Comments)     Reaction Date: 03Jul2006;     Shellfish Allergy - Food Allergy GI Intolerance       Current Medications     Current Outpatient Medications   Medication Sig Dispense Refill    ascorbic acid (VITAMIN C) 500 MG tablet Take 500 mg by mouth daily      ASPIRIN 81 PO Take 1 tablet by mouth in the morning      carvedilol (COREG CR) 40 MG 24 hr capsule Take 40 mg by mouth daily      Coenzyme Q10 100 MG TABS Take 200 mg by mouth      Dapagliflozin Propanediol (Farxiga) 10 MG TABS Take 10 mg by mouth daily      levalbuterol (Xopenex HFA) 45 mcg/act inhaler Inhale 1-2 puffs every 4 (four) hours as needed for wheezing 15 g 3    multivitamin-minerals (CENTRUM) tablet Take 1 tablet by mouth daily      Omega-3 Fatty Acids (Fish Oil) 1200 MG CAPS Take 1,200 mg by mouth daily      rivaroxaban (XARELTO) 20 mg tablet Take 20 mg by mouth      sacubitril-valsartan (Entresto) 49-51 MG TABS Take 1 tablet by mouth 2 (two) times a day      spironolactone (ALDACTONE) 25 mg tablet Take 25 mg by mouth daily      tadalafil (CIALIS) 20 MG tablet TAKE 1 TABLET BY MOUTH IF NEEDED FOR ERECTILE DYSFUNCTION PRIOR TO SEX      apixaban (ELIQUIS) 5 mg Take 5 mg by mouth 2 (two) times a day (Patient not taking: Reported on 7/18/2022)      carvedilol (COREG) 12 5 mg tablet 2 (two) times a day (Patient not taking: Reported on 7/18/2022)       No current facility-administered medications for this visit           Health Maintenance     Health Maintenance   Topic Date Due    Hepatitis C Screening  Never done    COVID-19 Vaccine (1) Never done    Pneumococcal Vaccine: Pediatrics (0 to 5 Years) and At-Risk Patients (6 to 59 Years) (1 - PCV) Never done    HIV Screening  Never done    Colorectal Cancer Screening  Never done    DTaP,Tdap,and Td Vaccines (1 - Tdap) 07/04/2006    Influenza Vaccine (1) 09/01/2022    Annual Physical  01/17/2023    Depression Screening  07/18/2023    BMI: Adult  07/18/2023    HIB Vaccine  Aged Out    Hepatitis B Vaccine  Aged Out    IPV Vaccine  Aged Out    Hepatitis A Vaccine  Aged Out    Meningococcal ACWY Vaccine  Aged Out    HPV Vaccine  Aged [employer] History   Administered Date(s) Administered    Tetanus, adsorbed 07/03/2006          [address],

## 2022-07-30 LAB
ALBUMIN SERPL-MCNC: 4.4 G/DL (ref 3.8–4.8)
ALBUMIN/GLOB SERPL: 1.6 {RATIO} (ref 1.2–2.2)
ALP SERPL-CCNC: 32 IU/L (ref 44–121)
ALT SERPL-CCNC: 11 IU/L (ref 0–44)
AST SERPL-CCNC: 19 IU/L (ref 0–40)
BASOPHILS # BLD AUTO: 0.1 X10E3/UL (ref 0–0.2)
BASOPHILS NFR BLD AUTO: 1 %
BILIRUB SERPL-MCNC: 0.6 MG/DL (ref 0–1.2)
BUN SERPL-MCNC: 16 MG/DL (ref 8–27)
BUN/CREAT SERPL: 14 (ref 10–24)
CALCIUM SERPL-MCNC: 9.2 MG/DL (ref 8.6–10.2)
CHLORIDE SERPL-SCNC: 104 MMOL/L (ref 96–106)
CHOLEST SERPL-MCNC: 180 MG/DL (ref 100–199)
CO2 SERPL-SCNC: 24 MMOL/L (ref 20–29)
CREAT SERPL-MCNC: 1.16 MG/DL (ref 0.76–1.27)
EGFR: 71 ML/MIN/1.73
EOSINOPHIL # BLD AUTO: 0.2 X10E3/UL (ref 0–0.4)
EOSINOPHIL NFR BLD AUTO: 5 %
ERYTHROCYTE [DISTWIDTH] IN BLOOD BY AUTOMATED COUNT: 12.5 % (ref 11.6–15.4)
GLOBULIN SER-MCNC: 2.7 G/DL (ref 1.5–4.5)
GLUCOSE SERPL-MCNC: 75 MG/DL (ref 65–99)
HCT VFR BLD AUTO: 40.4 % (ref 37.5–51)
HDLC SERPL-MCNC: 73 MG/DL
HGB BLD-MCNC: 13.9 G/DL (ref 13–17.7)
IMM GRANULOCYTES # BLD: 0 X10E3/UL (ref 0–0.1)
IMM GRANULOCYTES NFR BLD: 0 %
LDLC SERPL CALC-MCNC: 98 MG/DL (ref 0–99)
LYMPHOCYTES # BLD AUTO: 1.2 X10E3/UL (ref 0.7–3.1)
LYMPHOCYTES NFR BLD AUTO: 28 %
MCH RBC QN AUTO: 30.5 PG (ref 26.6–33)
MCHC RBC AUTO-ENTMCNC: 34.4 G/DL (ref 31.5–35.7)
MCV RBC AUTO: 89 FL (ref 79–97)
MONOCYTES # BLD AUTO: 0.4 X10E3/UL (ref 0.1–0.9)
MONOCYTES NFR BLD AUTO: 10 %
NEUTROPHILS # BLD AUTO: 2.4 X10E3/UL (ref 1.4–7)
NEUTROPHILS NFR BLD AUTO: 56 %
PLATELET # BLD AUTO: 191 X10E3/UL (ref 150–450)
POTASSIUM SERPL-SCNC: 4.1 MMOL/L (ref 3.5–5.2)
PROT SERPL-MCNC: 7.1 G/DL (ref 6–8.5)
RBC # BLD AUTO: 4.55 X10E6/UL (ref 4.14–5.8)
SODIUM SERPL-SCNC: 142 MMOL/L (ref 134–144)
TRIGL SERPL-MCNC: 42 MG/DL (ref 0–149)
TSH SERPL DL<=0.005 MIU/L-ACNC: 0.88 UIU/ML (ref 0.45–4.5)
WBC # BLD AUTO: 4.3 X10E3/UL (ref 3.4–10.8)

## 2022-08-04 ENCOUNTER — VBI (OUTPATIENT)
Dept: ADMINISTRATIVE | Facility: OTHER | Age: 62
End: 2022-08-04

## 2023-01-26 ENCOUNTER — VBI (OUTPATIENT)
Dept: ADMINISTRATIVE | Facility: OTHER | Age: 63
End: 2023-01-26

## 2023-02-24 ENCOUNTER — RA CDI HCC (OUTPATIENT)
Dept: OTHER | Facility: HOSPITAL | Age: 63
End: 2023-02-24

## 2023-02-24 NOTE — PROGRESS NOTES
[facility]  coding opportunities          Chart Reviewed number of suggestions sent to Provider: 3     Patients Insurance        Commercial Insurance: Kristian 93     [id number]  [id number]  [id number]

## 2023-03-03 ENCOUNTER — TELEPHONE (OUTPATIENT)
Dept: ADMINISTRATIVE | Facility: OTHER | Age: 63
End: 2023-03-03

## 2023-03-03 NOTE — TELEPHONE ENCOUNTER
----- Message from Lloyd Hill LPN sent at 7/3/7000 11:35 AM EST -----  Regarding: care gap request  03/03/23 11:35 AM    Hello, our patient attached above has had Hepatitis C completed/performed  Please assist in updating the patient chart by pulling the Care Everywhere (CE) document  The date of service is 4/18/2019       Thank you,  Lloyd ARTEAGA

## 2023-03-06 ENCOUNTER — OFFICE VISIT (OUTPATIENT)
Dept: FAMILY MEDICINE CLINIC | Facility: CLINIC | Age: 63
End: 2023-03-06

## 2023-03-06 VITALS
HEART RATE: 89 BPM | SYSTOLIC BLOOD PRESSURE: 112 MMHG | WEIGHT: 181.6 LBS | DIASTOLIC BLOOD PRESSURE: 82 MMHG | BODY MASS INDEX: 22.58 KG/M2 | HEIGHT: 75 IN | OXYGEN SATURATION: 97 % | RESPIRATION RATE: 14 BRPM | TEMPERATURE: 97.5 F

## 2023-03-06 DIAGNOSIS — Z95.810 ICD (IMPLANTABLE CARDIOVERTER-DEFIBRILLATOR), BIVENTRICULAR, IN SITU: ICD-10-CM

## 2023-03-06 DIAGNOSIS — Z12.5 SCREENING FOR PROSTATE CANCER: ICD-10-CM

## 2023-03-06 DIAGNOSIS — Z12.11 SCREENING FOR COLON CANCER: ICD-10-CM

## 2023-03-06 DIAGNOSIS — J45.20 MILD INTERMITTENT ASTHMA WITHOUT COMPLICATION: ICD-10-CM

## 2023-03-06 DIAGNOSIS — Z13.6 SCREENING FOR CARDIOVASCULAR CONDITION: ICD-10-CM

## 2023-03-06 DIAGNOSIS — I42.0 DILATED CARDIOMYOPATHY (HCC): ICD-10-CM

## 2023-03-06 DIAGNOSIS — Z00.00 ANNUAL PHYSICAL EXAM: Primary | ICD-10-CM

## 2023-03-06 DIAGNOSIS — Z23 IMMUNIZATION DUE: ICD-10-CM

## 2023-03-06 DIAGNOSIS — I10 HYPERTENSION, UNSPECIFIED TYPE: ICD-10-CM

## 2023-03-06 DIAGNOSIS — Z13.1 SCREENING FOR DIABETES MELLITUS: ICD-10-CM

## 2023-03-06 DIAGNOSIS — I50.9 CONGESTIVE HEART FAILURE, UNSPECIFIED HF CHRONICITY, UNSPECIFIED HEART FAILURE TYPE (HCC): ICD-10-CM

## 2023-03-06 NOTE — PROGRESS NOTES
[address] [facility] PRACTICE    NAME: Radha Ugarte  AGE: 58 y o  SEX: male  : 1960     DATE: 3/6/2023     Assessment and Plan:     Problem List Items Addressed This Visit        Respiratory    Mild intermittent asthma without complication       Cardiovascular and Mediastinum    Dilated cardiomyopathy ([address] )    Congestive heart failure ([address] )    Hypertension       Other    ICD (implantable cardioverter-defibrillator), biventricular, in situ   Other Visit Diagnoses     Annual physical exam    -  Primary    Brigido Ohm appears well  He is to continue a healthy, lower salt diet, and regular exercise  FBW ordered  Screening for diabetes mellitus        Relevant Orders    Comprehensive metabolic panel    Screening for cardiovascular condition        Relevant Orders    Lipid Panel with Direct LDL reflex    Screening for prostate cancer        Relevant Orders    PSA, Total Screen    Immunization due        Tdap and Prev-20 given IM, and tolerated well  Relevant Orders    Pneumococcal Conjugate Vaccine 20-valent (Pcv20)    TDAP VACCINE GREATER THAN OR EQUAL TO 6YO IM    Screening for colon cancer        Pt referred again to Gastroenterology  Relevant Orders    Ambulatory Referral to Gastroenterology          Immunizations and preventive care screenings were discussed with patient today  Appropriate education was printed on patient's after visit summary  Discussed risks and benefits of prostate cancer screening  We discussed the controversial history of PSA screening for prostate cancer in the [place] as well as the risk of over detection and over treatment of prostate cancer by way of PSA screening    The patient understands that PSA blood testing is an imperfect way to screen for prostate cancer and that elevated PSA levels in the blood may also be caused by infection, inflammation, prostatic trauma or manipulation, urological procedures, or by benign prostatic enlargement  The role of the digital rectal examination in prostate cancer screening was also discussed and I discussed with him that there is large interobserver variability in the findings of digital rectal examination  Counseling:  Dental Health: discussed importance of regular tooth brushing, flossing, and dental visits  · Exercise: the importance of regular exercise/physical activity was discussed  Recommend exercise 3-5 times per week for at least 30 minutes  Depression Screening and Follow-up Plan: Patient was screened for depression during today's encounter  They screened negative with a PHQ-2 score of 0  Return in 1 year (on 3/6/2024) for Annual physical      Chief Complaint:     Chief Complaint   Patient presents with   • Physical Exam     Patient being seen for physical       History of Present Illness:     Adult Annual Physical   Patient here for a comprehensive physical exam  The patient reports no problems  Pt is followed by Cardiologist still (CHF patient; hx of MV Clip) and Urology in in [place], [place] (had reassuring VIKTORIA/prostatic exam 6/22/22)  Still works for the [employer]  Pt never saw GI last year after referred  Pt vaccinated against COV-19  Diet and Physical Activity  · Diet/Nutrition: well balanced diet  · Exercise: walking  Depression Screening  PHQ-2/9 Depression Screening    Little interest or pleasure in doing things: 0 - not at all  Feeling down, depressed, or hopeless: 0 - not at all  PHQ-2 Score: 0  PHQ-2 Interpretation: Negative depression screen       General Health  · Sleep: sleeps well  · Hearing: normal - bilateral   · Vision: no vision problems  · Dental: regular dental visits  Making an appt   Health  · Symptoms include: none     Review of Systems:     Review of Systems   Constitutional: Negative for activity change     Respiratory: Negative for shortness of breath  Cardiovascular: Negative for chest pain and leg swelling  Gastrointestinal: Negative for abdominal pain and blood in stool  Genitourinary: Negative for decreased urine volume and difficulty urinating  Psychiatric/Behavioral: Negative for dysphoric mood  The patient is not nervous/anxious  Past Medical History:     History reviewed  No pertinent past medical history  Past Surgical History:     Past Surgical History:   Procedure Laterality Date   • CARDIAC PACEMAKER PLACEMENT     • MITRAL VALVULOPLASTY     • OTHER SURGICAL HISTORY      Groin surgery      Family History:     Family History   Problem Relation Age of Onset   • Diabetes Mother    • Hypertension Mother    • Heart failure Mother    • Diabetes Father    • Hypertension Father       Social History:     Social History     Socioeconomic History   • Marital status: /Civil Union     Spouse name: None   • Number of children: None   • Years of education: None   • Highest education level: None   Occupational History   • None   Tobacco Use   • Smoking status: Never   • Smokeless tobacco: Never   Vaping Use   • Vaping Use: Never used   Substance and Sexual Activity   • Alcohol use:  Yes   • Drug use: Not Currently   • Sexual activity: None   Other Topics Concern   • None   Social History Narrative   • None     Social Determinants of Health     Financial Resource Strain: Not on file   Food Insecurity: Not on file   Transportation Needs: Not on file   Physical Activity: Not on file   Stress: Not on file   Social Connections: Not on file   Intimate Partner Violence: Not on file   Housing Stability: Not on file      Current Medications:     Current Outpatient Medications   Medication Sig Dispense Refill   • ascorbic acid (VITAMIN C) 500 MG tablet Take 500 mg by mouth daily     • ASPIRIN 81 PO Take 1 tablet by mouth in the morning     • carvedilol (COREG CR) 40 MG 24 hr capsule Take 40 mg by mouth daily     • Coenzyme Q10 100 MG TABS Take 200 mg by mouth     • Dapagliflozin Propanediol (Farxiga) 10 MG TABS Take 10 mg by mouth daily     • levalbuterol (Xopenex HFA) 45 mcg/act inhaler Inhale 1-2 puffs every 4 (four) hours as needed for wheezing 15 g 3   • multivitamin-minerals (CENTRUM) tablet Take 1 tablet by mouth daily     • Omega-3 Fatty Acids (Fish Oil) 1200 MG CAPS Take 1,200 mg by mouth daily     • rivaroxaban (XARELTO) 20 mg tablet Take 20 mg by mouth     • sacubitril-valsartan (Entresto) 49-51 MG TABS Take 1 tablet by mouth 2 (two) times a day     • spironolactone (ALDACTONE) 25 mg tablet Take 25 mg by mouth daily     • tadalafil (CIALIS) 20 MG tablet TAKE 1 TABLET BY MOUTH IF NEEDED FOR ERECTILE DYSFUNCTION PRIOR TO SEX     • apixaban (ELIQUIS) 5 mg Take 5 mg by mouth 2 (two) times a day (Patient not taking: Reported on 3/6/2023)     • carvedilol (COREG) 12 5 mg tablet 2 (two) times a day (Patient not taking: Reported on 7/18/2022)       No current facility-administered medications for this visit  Allergies: Allergies   Allergen Reactions   • Molds & Smuts Shortness Of Breath     Reaction Date: 03Jul2006;   Reaction Date: 03Jul2006;    • Cat Hair Extract Other (See Comments)     Reaction Date: 03Jul2006;   Reaction Date: 03Jul2006;    • Shellfish Allergy - Food Allergy GI Intolerance      Physical Exam:     /82 (BP Location: Left arm, Patient Position: Sitting, Cuff Size: Large)   Pulse 89   Temp 97 5 °F (36 4 °C) (Tympanic)   Resp 14   Ht 6' 3" (1 905 m)   Wt 82 4 kg (181 lb 9 6 oz)   SpO2 97%   BMI 22 70 kg/m²     Physical Exam  Vitals and nursing note reviewed  Constitutional:       General: He is not in acute distress  Appearance: Normal appearance  He is not ill-appearing, toxic-appearing or diaphoretic  HENT:      Head: Normocephalic and atraumatic        Right Ear: Tympanic membrane, ear canal and external ear normal       Left Ear: Tympanic membrane, ear canal and external ear normal       Mouth/Throat: Mouth: Mucous membranes are moist       Pharynx: Oropharynx is clear  No oropharyngeal exudate or posterior oropharyngeal erythema  Eyes:      General: No scleral icterus  Conjunctiva/sclera: Conjunctivae normal    Cardiovascular:      Rate and Rhythm: Normal rate and regular rhythm  Heart sounds: Normal heart sounds  No murmur heard  No friction rub  No gallop  Pulmonary:      Effort: Pulmonary effort is normal  No respiratory distress  Breath sounds: Normal breath sounds  No stridor  No wheezing, rhonchi or rales  Abdominal:      General: Abdomen is flat  Bowel sounds are normal  There is no distension  Palpations: Abdomen is soft  There is no mass  Tenderness: There is no abdominal tenderness  There is no guarding or rebound  Musculoskeletal:      Cervical back: Normal range of motion and neck supple  No rigidity or tenderness  Lymphadenopathy:      Cervical: No cervical adenopathy  Neurological:      Mental Status: He is alert and oriented to person, place, and time  Psychiatric:         Mood and Affect: Mood normal          Behavior: Behavior normal          Thought Content: Thought content normal          Judgment: Judgment normal           Elizabeth Nassar was seen today for physical exam     Diagnoses and all orders for this visit:    Annual physical exam  Comments:  Alla Ruiz appears well  He is to continue a healthy, lower salt diet, and regular exercise  FBW ordered  Dilated cardiomyopathy [address] [address] )  Comments:  Stable - on Carvedilol, Xarelto, Entresto, Farxiga, and Spironolactone  Congestive heart failure, unspecified HF chronicity, unspecified heart failure type ([address] )  Comments:  Stable; pt euvolemic - as above  ICD (implantable cardioverter-defibrillator), biventricular, in situ  Comments:  As above  Hypertension, unspecified type  Comments:  Controlled on present management        Mild intermittent asthma without complication  Comments:  Stable; no wheezing reported  Screening for diabetes mellitus  -     Comprehensive metabolic panel; Future    Screening for cardiovascular condition  -     Lipid Panel with Direct LDL reflex; Future    Screening for prostate cancer  -     PSA, Total Screen; Future    Immunization due  Comments: Tdap and Prev-20 given IM, and tolerated well  Orders:  -     Pneumococcal Conjugate Vaccine 20-valent (Pcv20)  -     TDAP VACCINE GREATER THAN OR EQUAL TO 8YO IM    Screening for colon cancer  Comments:  Pt referred again to Gastroenterology  Orders:  -     Ambulatory Referral to Gastroenterology;  Future          [address] Lily Sierra DO  [address]

## 2023-03-07 NOTE — TELEPHONE ENCOUNTER
Upon review of the In Basket request we were able to locate, review, and update the patient chart as requested for Hepatitis C   Any additional questions or concerns should be emailed to the Practice Liaisons via the appropriate education email address, please do not reply via In Basket      Thank you  Sue Ames

## 2023-03-29 ENCOUNTER — TELEPHONE (OUTPATIENT)
Dept: GASTROENTEROLOGY | Facility: CLINIC | Age: 63
End: 2023-03-29

## 2023-03-29 NOTE — TELEPHONE ENCOUNTER
Patients GI provider:  Dr Ta Lovell    Number to return call: [phone]    Reason for call: Pt is on Xarelto and under the care of a Cardiologist - referral was for Dianna - but pt needed a Monday afternoon in May Kayla Quinonez was available when pt was     Scheduled procedure/appointment date if applicable: Appt: 9/99/60

## 2023-05-15 ENCOUNTER — CONSULT (OUTPATIENT)
Dept: GASTROENTEROLOGY | Facility: CLINIC | Age: 63
End: 2023-05-15

## 2023-05-15 VITALS
HEART RATE: 68 BPM | BODY MASS INDEX: 23.57 KG/M2 | HEIGHT: 75 IN | WEIGHT: 189.6 LBS | DIASTOLIC BLOOD PRESSURE: 80 MMHG | SYSTOLIC BLOOD PRESSURE: 129 MMHG

## 2023-05-15 DIAGNOSIS — Z79.01 ANTICOAGULANT LONG-TERM USE: ICD-10-CM

## 2023-05-15 DIAGNOSIS — Z12.11 SCREEN FOR COLON CANCER: Primary | ICD-10-CM

## 2023-05-15 NOTE — ASSESSMENT & PLAN NOTE
Screening for colon cancer - patient is at average risk for colon cancer screening  Rule out colorectal lesions including polyps or malignancy     -Schedule for colonoscopy  -High-fiber diet     -Patient was given instructions about the colonoscopy prep     -Patient was explained about the risks and benefits of the procedure  Risks including but not limited to bleeding, infection, perforation were explained in detail  Also explained about less than 100% sensitivity with the exam and other alternatives

## 2023-05-15 NOTE — PROGRESS NOTES
Consultation - [facility]  Zack Barnett 1960 male         Chief Complaint: For colonoscopy    HPI: 51-year-old male with history of CHF, ICD placement on anticoagulation therapy with Xarelto was referred for colonoscopy  Patient never had a colonoscopy in the past   Patient has regular bowel movements and denies any blood or mucus in the stool  Appetite is good and denies any recent weight loss  Denies any abdominal pain, nausea, or vomiting  Has no heartburn or acid reflux  Denies any difficulty swallowing  Chaperon: Ms Margean Frankel: Review of Systems   Constitutional: Negative for activity change, appetite change, chills, diaphoresis, fatigue, fever and unexpected weight change  HENT: Negative for ear discharge, ear pain, facial swelling, hearing loss, nosebleeds, sore throat, tinnitus and voice change  Eyes: Negative for pain, discharge, redness, itching and visual disturbance  Respiratory: Negative for apnea, cough, chest tightness, shortness of breath and wheezing  Cardiovascular: Negative for chest pain and palpitations  Gastrointestinal:        As noted in HPI   Endocrine: Negative for cold intolerance, heat intolerance and polyuria  Genitourinary: Negative for difficulty urinating, dysuria, flank pain, hematuria and urgency  Musculoskeletal: Negative for arthralgias, back pain, gait problem, joint swelling and myalgias  Skin: Negative for rash and wound  Neurological: Negative for dizziness, tremors, seizures, speech difficulty, light-headedness, numbness and headaches  Hematological: Negative for adenopathy  Does not bruise/bleed easily  Psychiatric/Behavioral: Negative for agitation, behavioral problems and confusion  The patient is not nervous/anxious  History reviewed  No pertinent past medical history     Past Surgical History:   Procedure Laterality Date   • CARDIAC PACEMAKER PLACEMENT     • MITRAL VALVULOPLASTY     • OTHER SURGICAL HISTORY      Groin surgery     Social History     Socioeconomic History   • Marital status: /Civil Union     Spouse name: Not on file   • Number of children: Not on file   • Years of education: Not on file   • Highest education level: Not on file   Occupational History   • Not on file   Tobacco Use   • Smoking status: Never   • Smokeless tobacco: Never   Vaping Use   • Vaping Use: Never used   Substance and Sexual Activity   • Alcohol use:  Yes   • Drug use: Not Currently   • Sexual activity: Not on file   Other Topics Concern   • Not on file   Social History Narrative   • Not on file     Social Determinants of Health     Financial Resource Strain: Not on file   Food Insecurity: Not on file   Transportation Needs: Not on file   Physical Activity: Not on file   Stress: Not on file   Social Connections: Not on file   Intimate Partner Violence: Not on file   Housing Stability: Not on file     Family History   Problem Relation Age of Onset   • Diabetes Mother    • Hypertension Mother    • Heart failure Mother    • Diabetes Father    • Hypertension Father      Molds & smuts, Cat hair extract, and Shellfish allergy - food allergy  Current Outpatient Medications   Medication Sig Dispense Refill   • ascorbic acid (VITAMIN C) 500 MG tablet Take 500 mg by mouth daily     • ASPIRIN 81 PO Take 1 tablet by mouth in the morning     • carvedilol (COREG CR) 40 MG 24 hr capsule Take 40 mg by mouth daily     • Coenzyme Q10 100 MG TABS Take 200 mg by mouth     • Dapagliflozin Propanediol (Farxiga) 10 MG TABS Take 10 mg by mouth daily     • levalbuterol (Xopenex HFA) 45 mcg/act inhaler Inhale 1-2 puffs every 4 (four) hours as needed for wheezing 15 g 3   • multivitamin-minerals (CENTRUM) tablet Take 1 tablet by mouth daily     • Omega-3 Fatty Acids (Fish Oil) 1200 MG CAPS Take 1,200 mg by mouth daily     • rivaroxaban (XARELTO) 20 mg tablet Take 20 mg by mouth     • sacubitril-valsartan (Entresto) 49-51 MG TABS "Take 1 tablet by mouth 2 (two) times a day     • sodium picosulfate, magnesium oxide, citric acid (Clenpiq) oral solution Take 175 mL by mouth see administration instructions 160 mL 0   • spironolactone (ALDACTONE) 25 mg tablet Take 25 mg by mouth daily     • tadalafil (CIALIS) 20 MG tablet TAKE 1 TABLET BY MOUTH IF NEEDED FOR ERECTILE DYSFUNCTION PRIOR TO SEX     • apixaban (ELIQUIS) 5 mg Take 5 mg by mouth 2 (two) times a day (Patient not taking: Reported on 3/6/2023)     • carvedilol (COREG) 12 5 mg tablet 2 (two) times a day (Patient not taking: Reported on 7/18/2022)       No current facility-administered medications for this visit  Blood pressure 129/80, pulse 68, height 6' 3\" (1 905 m), weight 86 kg (189 lb 9 6 oz)  PHYSICAL EXAM: Physical Exam  Constitutional:       Appearance: He is well-developed  HENT:      Head: Normocephalic and atraumatic  Eyes:      General: No scleral icterus  Right eye: No discharge  Left eye: No discharge  Conjunctiva/sclera: Conjunctivae normal       Pupils: Pupils are equal, round, and reactive to light  Neck:      Thyroid: No thyromegaly  Vascular: No JVD  Trachea: No tracheal deviation  Cardiovascular:      Rate and Rhythm: Normal rate and regular rhythm  Heart sounds: Normal heart sounds  No murmur heard  No friction rub  No gallop  Pulmonary:      Effort: Pulmonary effort is normal  No respiratory distress  Breath sounds: Normal breath sounds  No wheezing or rales  Chest:      Chest wall: No tenderness  Abdominal:      General: Bowel sounds are normal  There is no distension  Palpations: Abdomen is soft  There is no mass  Tenderness: There is no abdominal tenderness  There is no guarding or rebound  Hernia: No hernia is present  Musculoskeletal:      Cervical back: Neck supple  Lymphadenopathy:      Cervical: No cervical adenopathy  Skin:     General: Skin is warm and dry        Findings: No " erythema or rash  Neurological:      Mental Status: He is alert and oriented to person, place, and time  Psychiatric:         Behavior: Behavior normal          Thought Content: Thought content normal           Lab Results   Component Value Date    WBC 4 3 07/29/2022    HGB 13 9 07/29/2022    HCT 40 4 07/29/2022    MCV 89 07/29/2022     07/29/2022     Lab Results   Component Value Date    GLUCOSE 86 10/16/2015    CALCIUM 8 4 10/16/2015     10/16/2015    K 4 1 07/29/2022    CO2 24 07/29/2022     07/29/2022    BUN 16 07/29/2022    CREATININE 1 16 07/29/2022     Lab Results   Component Value Date    ALT 11 07/29/2022    AST 19 07/29/2022    ALKPHOS 39 (L) 10/15/2015    BILITOT 0 4 10/15/2015     Lab Results   Component Value Date    INR 1 00 10/15/2015    PROTIME 10 1 10/15/2015       No results found  ASSESSMENT & PLAN:    Screen for colon cancer  Screening for colon cancer - patient is at average risk for colon cancer screening  Rule out colorectal lesions including polyps or malignancy     -Schedule for colonoscopy  -High-fiber diet     -Patient was given instructions about the colonoscopy prep     -Patient was explained about the risks and benefits of the procedure  Risks including but not limited to bleeding, infection, perforation were explained in detail  Also explained about less than 100% sensitivity with the exam and other alternatives  Anticoagulant long-term use  Patient is at increased risks because of anticoagulation therapy  Advised him to check with his cardiologist about holding Xarelto prior to the procedures

## 2023-05-15 NOTE — ASSESSMENT & PLAN NOTE
Patient is at increased risks because of anticoagulation therapy  Advised him to check with his cardiologist about holding Xarelto prior to the procedures

## 2023-05-15 NOTE — PATIENT INSTRUCTIONS
Scheduled date of EGD/colonoscopy (as of today): 06/07/23  Physician performing EGD/colonoscopy: [id number]  [facility]  Desired bowel prep reviewed with patient: [facility]  Instructions reviewed with patient by: VIRGINIA  Clearances: Stacey Magallanes FAXED TO DR Hernandez Briceño

## 2023-05-17 ENCOUNTER — TELEPHONE (OUTPATIENT)
Dept: GASTROENTEROLOGY | Facility: CLINIC | Age: 63
End: 2023-05-17

## 2023-05-17 NOTE — TELEPHONE ENCOUNTER
xarelto hold request faxed to Dr Graciela Nunez keeps being returned as patient not found  Patient will call and get verbal clearance to hold Kieran Gaston for 2 days prior to colonoscopy

## 2023-05-18 NOTE — TELEPHONE ENCOUNTER
Pt calling back and stated be get verbal from his Cardiologist about his Xarelto PLAN:/t requesting a call back from the office

## 2023-05-23 ENCOUNTER — TELEPHONE (OUTPATIENT)
Dept: GASTROENTEROLOGY | Facility: CLINIC | Age: 63
End: 2023-05-23

## 2023-05-23 NOTE — TELEPHONE ENCOUNTER
Patients GI provider:  Dr Rodolfo Dawn    Number to return call: [phone]    Reason for call: Pt called and rescheduled procedure for 6/16/2023  Pt was previously scheduled in [facility] [address]  Please assist as he is scheduled at [facility]      Scheduled procedure/appointment date if applicable: Procedure: 8/50/4796

## 2023-05-26 NOTE — TELEPHONE ENCOUNTER
Faisal with patient confirming we received his message   Patient can hold Xarelto for 2 days prior to colonoscopy

## 2023-07-14 PROBLEM — Z12.11 SCREEN FOR COLON CANCER: Status: RESOLVED | Noted: 2023-05-15 | Resolved: 2023-07-14

## 2024-03-11 ENCOUNTER — OFFICE VISIT (OUTPATIENT)
Dept: FAMILY MEDICINE CLINIC | Facility: CLINIC | Age: 64
End: 2024-03-11
Payer: COMMERCIAL

## 2024-03-11 VITALS
SYSTOLIC BLOOD PRESSURE: 114 MMHG | DIASTOLIC BLOOD PRESSURE: 62 MMHG | OXYGEN SATURATION: 96 % | HEIGHT: 72 IN | WEIGHT: 191.4 LBS | HEART RATE: 73 BPM | BODY MASS INDEX: 25.92 KG/M2 | TEMPERATURE: 96.8 F | RESPIRATION RATE: 16 BRPM

## 2024-03-11 DIAGNOSIS — Z00.00 ANNUAL PHYSICAL EXAM: Primary | ICD-10-CM

## 2024-03-11 DIAGNOSIS — Z13.220 ENCOUNTER FOR LIPID SCREENING FOR CARDIOVASCULAR DISEASE: ICD-10-CM

## 2024-03-11 DIAGNOSIS — J45.20 MILD INTERMITTENT ASTHMA WITHOUT COMPLICATION: ICD-10-CM

## 2024-03-11 DIAGNOSIS — I50.9 CONGESTIVE HEART FAILURE, UNSPECIFIED HF CHRONICITY, UNSPECIFIED HEART FAILURE TYPE (HCC): ICD-10-CM

## 2024-03-11 DIAGNOSIS — Z13.1 SCREENING FOR DIABETES MELLITUS (DM): ICD-10-CM

## 2024-03-11 DIAGNOSIS — Z13.6 ENCOUNTER FOR LIPID SCREENING FOR CARDIOVASCULAR DISEASE: ICD-10-CM

## 2024-03-11 DIAGNOSIS — Z12.11 SCREENING FOR COLON CANCER: ICD-10-CM

## 2024-03-11 DIAGNOSIS — I48.0 PAROXYSMAL ATRIAL FIBRILLATION (HCC): ICD-10-CM

## 2024-03-11 PROCEDURE — 99396 PREV VISIT EST AGE 40-64: CPT | Performed by: FAMILY MEDICINE

## 2024-03-11 NOTE — PROGRESS NOTES
ADULT ANNUAL PHYSICAL  [facility] PRACTICE    NAME: Sukhdeep Moreno  AGE: 63 y.o. SEX: male  : 1960     DATE: 3/14/2024     Assessment and Plan:     Problem List Items Addressed This Visit       Congestive heart failure (HCC)     Wt Readings from Last 3 Encounters:   24 86.8 kg (191 lb 6.4 oz)   05/15/23 86 kg (189 lb 9.6 oz)   23 82.4 kg (181 lb 9.6 oz)     Stable.  Follow with cardiology.               Mild intermittent asthma without complication     No recent exacerbations.  Xopenex inhaler up-to-date.         Paroxysmal atrial fibrillation (HCC)     Stable.  No recent episodes.  Continues on Xarelto 20 mg p.o. daily and carvedilol 40 mg.  Following with cardiology.          Other Visit Diagnoses       Annual physical exam    -  Primary    BMI 25.0-25.9,adult        Screening for colon cancer        Relevant Orders    Ambulatory Referral to Gastroenterology    Screening for diabetes mellitus (DM)        Relevant Orders    Comprehensive metabolic panel    Encounter for lipid screening for cardiovascular disease        Relevant Orders    Lipid Panel With Direct LDL              Immunizations and preventive care screenings were discussed with patient today. Appropriate education was printed on patient's after visit summary.    Discussed risks and benefits of prostate cancer screening. We discussed the controversial history of PSA screening for prostate cancer in the [place] as well as the risk of over detection and over treatment of prostate cancer by way of PSA screening.  The patient understands that PSA blood testing is an imperfect way to screen for prostate cancer and that elevated PSA levels in the blood may also be caused by infection, inflammation, prostatic trauma or manipulation, urological procedures, or by benign prostatic enlargement.    The role of the digital rectal examination in prostate cancer screening was also discussed  and I discussed with him that there is large interobserver variability in the findings of digital rectal examination.    Counseling:  Alcohol/drug use: discussed moderation in alcohol intake, the recommendations for healthy alcohol use, and avoidance of illicit drug use.  Dental Health: discussed importance of regular tooth brushing, flossing, and dental visits.  Injury prevention: discussed safety/seat belts, safety helmets, smoke detectors, carbon dioxide detectors, and smoking near bedding or upholstery.  Sexual health: discussed sexually transmitted diseases, partner selection, use of condoms, avoidance of unintended pregnancy, and contraceptive alternatives.  Exercise: the importance of regular exercise/physical activity was discussed. Recommend exercise 3-5 times per week for at least 30 minutes.          No follow-ups on file.     Chief Complaint:     Chief Complaint   Patient presents with    Physical Exam     Patient being seen for a physical exam      History of Present Illness:     Adult Annual Physical   Patient here for a comprehensive physical exam. The patient reports problems - as above .    Diet and Physical Activity  Diet/Nutrition: well balanced diet and consuming 3-5 servings of fruits/vegetables daily.   Exercise: walking and 3-4 times a week on average.      Depression Screening  PHQ-2/9 Depression Screening    Little interest or pleasure in doing things: 0 - not at all  Feeling down, depressed, or hopeless: 0 - not at all  PHQ-2 Score: 0  PHQ-2 Interpretation: Negative depression screen       General Health  Sleep: gets 7-8 hours of sleep on average.   Hearing: normal - bilateral.  Vision: goes for regular eye exams.   Dental: regular dental visits and brushes teeth twice daily.        Health  Symptoms include: erectile dysfunction    Advanced Care Planning  Do you have an advanced directive? no  Do you have a durable medical power of ? no  ACP document given to patient? no      Review of Systems:     Review of Systems   Constitutional:  Negative for chills and fever.   HENT:  Negative for ear pain and sore throat.    Eyes:  Negative for pain and visual disturbance.   Respiratory:  Negative for cough and shortness of breath.    Cardiovascular:  Negative for chest pain and palpitations.   Gastrointestinal:  Negative for abdominal pain and vomiting.   Genitourinary:  Negative for dysuria and hematuria.   Musculoskeletal:  Negative for arthralgias and back pain.   Skin:  Negative for color change and rash.   Neurological:  Negative for seizures and syncope.   Psychiatric/Behavioral:  Negative for confusion and sleep disturbance. The patient is not nervous/anxious.    All other systems reviewed and are negative.     Past Medical History:     History reviewed. No pertinent past medical history.   Past Surgical History:     Past Surgical History:   Procedure Laterality Date    CARDIAC PACEMAKER PLACEMENT      FL RETROGRADE PYELOGRAM  12/21/2017    MITRAL VALVULOPLASTY      OTHER SURGICAL HISTORY      Groin surgery      Family History:     Family History   Problem Relation Age of Onset    Diabetes Mother     Hypertension Mother     Heart failure Mother     Diabetes Father     Hypertension Father       Social History:     Social History     Socioeconomic History    Marital status: /Civil Union     Spouse name: None    Number of children: None    Years of education: None    Highest education level: None   Occupational History    None   Tobacco Use    Smoking status: Never    Smokeless tobacco: Never   Vaping Use    Vaping status: Never Used   Substance and Sexual Activity    Alcohol use: Yes    Drug use: Not Currently    Sexual activity: None   Other Topics Concern    None   Social History Narrative    None     Social Determinants of Health     Financial Resource Strain: Not on file   Food Insecurity: Not on file   Transportation Needs: Not on file   Physical Activity: Not on file   Stress:  Not on file   Social Connections: Not on file   Intimate Partner Violence: Not on file   Housing Stability: Not on file      Current Medications:     Current Outpatient Medications   Medication Sig Dispense Refill    ascorbic acid (VITAMIN C) 500 MG tablet Take 500 mg by mouth daily      ASPIRIN 81 PO Take 1 tablet by mouth in the morning      carvedilol (COREG CR) 40 MG 24 hr capsule Take 40 mg by mouth daily      Coenzyme Q10 100 MG TABS Take 200 mg by mouth      Dapagliflozin Propanediol (Farxiga) 10 MG TABS Take 10 mg by mouth daily      levalbuterol (Xopenex HFA) 45 mcg/act inhaler Inhale 1-2 puffs every 4 (four) hours as needed for wheezing 15 g 3    multivitamin-minerals (CENTRUM) tablet Take 1 tablet by mouth daily      Omega-3 Fatty Acids (Fish Oil) 1200 MG CAPS Take 1,200 mg by mouth daily      rivaroxaban (XARELTO) 20 mg tablet Take 20 mg by mouth      sacubitril-valsartan (Entresto) 49-51 MG TABS Take 1 tablet by mouth 2 (two) times a day      spironolactone (ALDACTONE) 25 mg tablet Take 25 mg by mouth daily      tadalafil (CIALIS) 20 MG tablet TAKE 1 TABLET BY MOUTH IF NEEDED FOR ERECTILE DYSFUNCTION PRIOR TO SEX      carvedilol (COREG) 12.5 mg tablet 2 (two) times a day (Patient not taking: Reported on 7/18/2022)      sodium picosulfate, magnesium oxide, citric acid (Clenpiq) oral solution Take 175 mL by mouth see administration instructions (Patient not taking: Reported on 3/11/2024) 160 mL 0     No current facility-administered medications for this visit.      Allergies:     Allergies   Allergen Reactions    Molds & Smuts Shortness Of Breath     Reaction Date: 03Jul2006;   Reaction Date: 03Jul2006;     Cat Hair Extract Other (See Comments)     Reaction Date: 03Jul2006;   Reaction Date: 03Jul2006;     Shellfish Allergy - Food Allergy GI Intolerance      Physical Exam:     /62 (BP Location: Left arm, Patient Position: Sitting, Cuff Size: Standard)   Pulse 73   Temp (!) 96.8 °F (36 °C)  "(Tympanic)   Resp 16   Ht 6' 0.24\" (1.835 m)   Wt 86.8 kg (191 lb 6.4 oz)   SpO2 96%   BMI 25.78 kg/m²     Physical Exam  Vitals and nursing note reviewed.   Constitutional:       General: He is not in acute distress.     Appearance: Normal appearance.   HENT:      Head: Normocephalic and atraumatic.      Right Ear: Tympanic membrane and external ear normal.      Left Ear: Tympanic membrane and external ear normal.      Nose: Nose normal.      Mouth/Throat:      Mouth: Mucous membranes are moist.   Eyes:      Extraocular Movements: Extraocular movements intact.      Conjunctiva/sclera: Conjunctivae normal.      Pupils: Pupils are equal, round, and reactive to light.   Cardiovascular:      Rate and Rhythm: Normal rate and regular rhythm.      Pulses: Normal pulses.      Heart sounds: Normal heart sounds. No murmur heard.  Pulmonary:      Effort: Pulmonary effort is normal.      Breath sounds: Normal breath sounds. No wheezing, rhonchi or rales.   Abdominal:      General: Bowel sounds are normal.      Palpations: Abdomen is soft.      Tenderness: There is no abdominal tenderness. There is no guarding.   Musculoskeletal:         General: Normal range of motion.      Cervical back: Normal range of motion.      Right lower leg: No edema.      Left lower leg: No edema.   Lymphadenopathy:      Cervical: No cervical adenopathy.   Skin:     General: Skin is warm.      Capillary Refill: Capillary refill takes less than 2 seconds.   Neurological:      General: No focal deficit present.      Mental Status: He is alert and oriented to person, place, and time.   Psychiatric:         Mood and Affect: Mood normal.         Behavior: Behavior normal.          Juan Antonio Blanco, DO [facility]    "

## 2024-03-14 NOTE — ASSESSMENT & PLAN NOTE
Stable.  No recent episodes.  Continues on Xarelto 20 mg p.o. daily and carvedilol 40 mg.  Following with cardiology.

## 2024-03-14 NOTE — ASSESSMENT & PLAN NOTE
Wt Readings from Last 3 Encounters:   03/11/24 86.8 kg (191 lb 6.4 oz)   05/15/23 86 kg (189 lb 9.6 oz)   03/06/23 82.4 kg (181 lb 9.6 oz)     Stable.  Follow with cardiology.

## 2024-03-25 ENCOUNTER — TELEPHONE (OUTPATIENT)
Age: 64
End: 2024-03-25

## 2024-03-25 NOTE — TELEPHONE ENCOUNTER
Scheduled date of colonoscopy (as of today):  5/10/24    Physician performing colonoscopy: DARRELL    Location of colonoscopy: [facility]    Bowel prep reviewed with patient: CLENPIQ / DIABETIC --WILL RESEND    Instructions reviewed with patient by: DAVID    Clearances: Pt takes xarelto, aware to hold for 2 days prior. Pt rescheduled from 6/16/23      Reviewed OA questions and there has been no chnages

## 2024-03-25 NOTE — TELEPHONE ENCOUNTER
Our mutual patient is scheduled for procedure: colonoscopy    On: May 10, 2024     With: Dr. Karey Krause MD    He/She is taking the following blood thinner: Xarelto (Rivaroxaban)    Can this be stopped  2days prior to the procedure    Physician Approving clearance:

## 2024-04-29 ENCOUNTER — ANESTHESIA (OUTPATIENT)
Dept: ANESTHESIOLOGY | Facility: HOSPITAL | Age: 64
End: 2024-04-29

## 2024-04-29 ENCOUNTER — ANESTHESIA EVENT (OUTPATIENT)
Dept: ANESTHESIOLOGY | Facility: HOSPITAL | Age: 64
End: 2024-04-29

## 2024-05-02 DIAGNOSIS — Z12.11 SCREEN FOR COLON CANCER: Primary | ICD-10-CM

## 2024-05-02 RX ORDER — SODIUM PICOSULFATE, MAGNESIUM OXIDE, AND ANHYDROUS CITRIC ACID 12; 3.5; 1 G/175ML; G/175ML; MG/175ML
LIQUID ORAL
Qty: 350 ML | Refills: 0 | Status: SHIPPED | OUTPATIENT
Start: 2024-05-02

## 2024-05-07 ENCOUNTER — HOSPITAL ENCOUNTER (OUTPATIENT)
Dept: RADIOLOGY | Facility: HOSPITAL | Age: 64
Discharge: HOME | End: 2024-05-07
Attending: ORTHOPAEDIC SURGERY
Payer: COMMERCIAL

## 2024-05-07 ENCOUNTER — OFFICE VISIT (OUTPATIENT)
Dept: OBGYN CLINIC | Facility: CLINIC | Age: 64
End: 2024-05-07
Payer: COMMERCIAL

## 2024-05-07 VITALS
OXYGEN SATURATION: 97 % | HEIGHT: 72 IN | HEART RATE: 57 BPM | DIASTOLIC BLOOD PRESSURE: 63 MMHG | SYSTOLIC BLOOD PRESSURE: 107 MMHG | WEIGHT: 191 LBS | BODY MASS INDEX: 25.87 KG/M2

## 2024-05-07 DIAGNOSIS — M25.562 PAIN IN BOTH KNEES, UNSPECIFIED CHRONICITY: ICD-10-CM

## 2024-05-07 DIAGNOSIS — M25.561 PAIN IN BOTH KNEES, UNSPECIFIED CHRONICITY: ICD-10-CM

## 2024-05-07 DIAGNOSIS — S83.411A SPRAIN OF MEDIAL COLLATERAL LIGAMENT OF RIGHT KNEE, INITIAL ENCOUNTER: ICD-10-CM

## 2024-05-07 DIAGNOSIS — M25.561 PAIN IN BOTH KNEES, UNSPECIFIED CHRONICITY: Primary | ICD-10-CM

## 2024-05-07 DIAGNOSIS — M25.562 PAIN IN BOTH KNEES, UNSPECIFIED CHRONICITY: Primary | ICD-10-CM

## 2024-05-07 PROCEDURE — 73562 X-RAY EXAM OF KNEE 3: CPT

## 2024-05-07 PROCEDURE — 99204 OFFICE O/P NEW MOD 45 MIN: CPT | Performed by: ORTHOPAEDIC SURGERY
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
## 2024-05-07 NOTE — PATIENT INSTRUCTIONS
STRENGTHENING:   Quadriceps:   Isometric Quad sets

## 2024-08-27 ENCOUNTER — TELEPHONE (OUTPATIENT)
Dept: FAMILY MEDICINE CLINIC | Facility: CLINIC | Age: 64
End: 2024-08-27

## 2024-08-27 ENCOUNTER — OFFICE VISIT (OUTPATIENT)
Dept: FAMILY MEDICINE CLINIC | Facility: CLINIC | Age: 64
End: 2024-08-27
Payer: COMMERCIAL

## 2024-08-27 VITALS
DIASTOLIC BLOOD PRESSURE: 76 MMHG | BODY MASS INDEX: 25.84 KG/M2 | SYSTOLIC BLOOD PRESSURE: 100 MMHG | OXYGEN SATURATION: 98 % | HEART RATE: 48 BPM | WEIGHT: 190.8 LBS | HEIGHT: 72 IN | RESPIRATION RATE: 16 BRPM | TEMPERATURE: 97.2 F

## 2024-08-27 DIAGNOSIS — I50.9 CONGESTIVE HEART FAILURE, UNSPECIFIED HF CHRONICITY, UNSPECIFIED HEART FAILURE TYPE (HCC): ICD-10-CM

## 2024-08-27 DIAGNOSIS — I48.0 PAROXYSMAL ATRIAL FIBRILLATION (HCC): Primary | ICD-10-CM

## 2024-08-27 PROCEDURE — 99213 OFFICE O/P EST LOW 20 MIN: CPT | Performed by: FAMILY MEDICINE

## 2024-08-27 NOTE — PATIENT INSTRUCTIONS
Voltaren gel on L knee up to 4 times daily    Patient Education     Exercise Band Exercises for the Knee and Ankle   About this topic   Using an exercise band is one way to strengthen your muscles. You can use an exercise band at home, work or when you travel. You can buy one at a sporting [employer] store or online and most cost less than 15 dollars. The color of the band lets you know how much tension it will give your muscles. The colors may differ slightly with each company that makes them. Ask what color band you should use.  General   Before starting with a program, ask your doctor if you are healthy enough to do these exercises. Your doctor may have you work with a  or physical therapist to make a safe exercise program to meet your needs. Be sure to check the rubber tubing or band for signs of tears or weakness before using it.  Strengthening Exercises   Strengthening exercises keep your muscles firm and strong. Start by repeating each exercise 2 to 3 times. Work up to doing each exercise 10 times. Try to do the exercises 2 to 3 times each day. Do all exercises slowly.  Knee exercises ? Tie a knot in a long piece of band to make a loop. Secure the band in a door by shutting in the knot at ankle level.  Knee bending ? Lie on your stomach. Loop the band around your ankle. Be sure there is slight tension when your leg is almost straight. Slowly bend your knee. Return to the starting position. Switch legs and repeat.  Knee straightening ? Sit in a chair facing away from the door. Loop the band around your ankle. Be sure there is slight tension when your leg is bent in the seated position. Straighten your right knee. Return to the starting position. Switch legs and repeat.  Ankle exercises ? Sit on the floor with your legs out in front of you for these. You can also sit in a chair. After you are done exercising one foot, switch feet and repeat.  Pulling foot up ? You will have to have someone hold the band for  this exercise. Otherwise, you can tie a large knot into each end of the band and close the ends of the band in the bottom of a door. Have the band around the top of your foot. Keeping your heel on the floor, pull your foot up towards your head. Bring your foot back to the starting position.  Pushing foot down ? Loop the band around the ball of the foot. Hold the ends of the band. Push down with the ball of your foot as if you were pressing on a gas pedal. Bring the foot back to the starting position.  Pulling foot in ? Loop the band around the ball of one foot. Cross your other leg over the leg with the band around it. Put the top foot on top of the band as if you were stepping on it. Pull the bottom foot in. Bring the foot back to the starting position.  Pulling foot out ? Loop the band around the ball of one foot. Step on the band with your other foot and straighten the leg. Pull the bottom foot out. Bring the foot back to the starting position.         What will the results be?   Stronger muscles  More toned legs  Better balance  Less risk of falling  Easier to walk and do other activities  Helpful tips   Stay active and work out to keep your muscles strong and flexible.  Keep a healthy weight to avoid putting too much stress on your joints. Eat a healthy diet to keep your muscles healthy.  Be sure you do not hold your breath when exercising. This can raise your blood pressure. If you tend to hold your breath, try counting out loud when exercising. If any exercise bothers you, stop right away.  Try walking or cycling at an easy pace for a few minutes to warm up your muscles. Do this again after exercising.  Exercise may be slightly uncomfortable, but you should not have sharp pains. If you do get sharp pains, stop what you are doing. If the sharp pains continue, call your doctor.  Last Reviewed Date   2021-06-24  Consumer Information Use and Disclaimer   This generalized information is a limited summary of  diagnosis, treatment, and/or medication information. It is not meant to be comprehensive and should be used as a tool to help the user understand and/or assess potential diagnostic and treatment options. It does NOT include all information about conditions, treatments, medications, side effects, or risks that may apply to a specific patient. It is not intended to be medical advice or a substitute for the medical advice, diagnosis, or treatment of a health care provider based on the health care provider's examination and assessment of a patient’s specific and unique circumstances. Patients must speak with a health care provider for complete information about their health, medical questions, and treatment options, including any risks or benefits regarding use of medications. This information does not endorse any treatments or medications as safe, effective, or approved for treating a specific patient. [employer], Inc[employer] and its affiliates disclaim any warranty or liability relating to this information or the use thereof. The use of this information is governed by the Terms of Use, available at https://www.wolterskluwer.com/en/know/clinical-effectiveness-terms   Copyright   Copyright © 2024 [employer]Date, Inc[employer] and its affiliates and/or licensors. All rights reserved.

## 2024-09-03 NOTE — ASSESSMENT & PLAN NOTE
Wt Readings from Last 3 Encounters:   08/27/24 86.5 kg (190 lb 12.8 oz)   05/07/24 86.6 kg (191 lb)   03/11/24 86.8 kg (191 lb 6.4 oz)       -Presents after recent admission to the ER from work where he had some what sounds like a syncopal episode.  Was transported to the ER with reported paroxysmal A-fib

## 2024-09-03 NOTE — ASSESSMENT & PLAN NOTE
- Presents for follow-up after recent evaluation at the ER taken from work notable syncopal-like episode.  Thought to have run of atrial fibrillation with also some orthostatic hypotension.  -Evaluated by cardiology while at [facility] ER  -Has followed up with his cardiologist-Dr. Rebollar since evaluation with device interrogation completed.  -Continues on carvedilol and Xarelto.  -Has follow-up scheduled with cardiology to discuss neck steps.

## 2024-09-03 NOTE — PROGRESS NOTES
Ambulatory Visit  Name: Sukhdeep Moreno      : 1960      MRN: [id number]  Encounter Provider: Juan Antonio Blanco DO  Encounter Date: 2024   Encounter department: [facility]    Assessment & Plan   1. Paroxysmal atrial fibrillation (HCC)  Assessment & Plan:  - Presents for follow-up after recent evaluation at the ER taken from work notable syncopal-like episode.  Thought to have run of atrial fibrillation with also some orthostatic hypotension.  -Evaluated by cardiology while at [facility] ER  -Has followed up with his cardiologist-Dr. Rebollar since evaluation with device interrogation completed.  -Continues on carvedilol and Xarelto.  -Has follow-up scheduled with cardiology to discuss neck steps.  2. Congestive heart failure, unspecified HF chronicity, unspecified heart failure type (HCC)  Assessment & Plan:  Wt Readings from Last 3 Encounters:   24 86.5 kg (190 lb 12.8 oz)   24 86.6 kg (191 lb)   24 86.8 kg (191 lb 6.4 oz)       -Presents after recent admission to the ER from work where he had some what sounds like a syncopal episode.  Was transported to the ER with reported paroxysmal A-fib             History of Present Illness     Scott is a 64-year-old male with past medical history of CHF, paroxysmal A-fib, cardiomyopathy who presents today for follow-up after being seen in the ER over a week ago.  Notes he was transported from work where he had syncopal episode.  Ambulance was called to transport at which time he does remember being transported.  Had evaluation done at [facility] in which cardiac workup was positive for possible paroxysmal A-fib on his device.  Also reported to have had some orthostatic hypotension.  He has followed up with cardiologist since his discharge -Dr. Rebollar with no further events.  Had some medication alterations and has been hydrating much better since then.  Denies any further symptoms or episodes.  Has been taking it  slow and understands importance of follow-up.  Has plan to follow-up with electrophysiologist as well.      Review of Systems   Constitutional:  Negative for chills and fever.   HENT:  Negative for ear pain and sore throat.    Eyes:  Negative for pain and visual disturbance.   Respiratory:  Negative for cough and shortness of breath.    Cardiovascular:  Negative for chest pain and palpitations.   Gastrointestinal:  Negative for abdominal pain and vomiting.   Genitourinary:  Negative for dysuria and hematuria.   Musculoskeletal:  Negative for arthralgias and back pain.   Skin:  Negative for color change and rash.   Neurological:  Negative for seizures and syncope.   Psychiatric/Behavioral:  Negative for confusion. The patient is not nervous/anxious.    All other systems reviewed and are negative.    History reviewed. No pertinent past medical history.  Past Surgical History:   Procedure Laterality Date    CARDIAC PACEMAKER PLACEMENT      FL RETROGRADE PYELOGRAM  12/21/2017    MITRAL VALVULOPLASTY      OTHER SURGICAL HISTORY      Groin surgery     Family History   Problem Relation Age of Onset    Diabetes Mother     Hypertension Mother     Heart failure Mother     Diabetes Father     Hypertension Father      Social History     Tobacco Use    Smoking status: Never    Smokeless tobacco: Never   Vaping Use    Vaping status: Never Used   Substance and Sexual Activity    Alcohol use: Yes    Drug use: Not Currently    Sexual activity: Not on file     Current Outpatient Medications on File Prior to Visit   Medication Sig    ascorbic acid (VITAMIN C) 500 MG tablet Take 500 mg by mouth daily    ASPIRIN 81 PO Take 1 tablet by mouth in the morning    carvedilol (COREG CR) 40 MG 24 hr capsule Take 80 mg by mouth daily    Coenzyme Q10 100 MG TABS Take 200 mg by mouth    Dapagliflozin Propanediol (Farxiga) 10 MG TABS Take 10 mg by mouth daily    levalbuterol (Xopenex HFA) 45 mcg/act inhaler Inhale 1-2 puffs every 4 (four) hours as  "needed for wheezing    MAGNESIUM OXIDE 400 PO Take 400 mg by mouth daily    magnesium oxide-pyridoxine (BEELITH) 362-20 MG TABS Take 1 tablet by mouth daily    multivitamin-minerals (CENTRUM) tablet Take 1 tablet by mouth daily    Omega-3 Fatty Acids (Fish Oil) 1200 MG CAPS Take 1,200 mg by mouth daily    rivaroxaban (XARELTO) 20 mg tablet Take 20 mg by mouth    sacubitril-valsartan (Entresto) 49-51 MG TABS Take 1 tablet by mouth 2 (two) times a day    sodium picosulfate, magnesium oxide, citric acid (Clenpiq) oral solution Take 175 mL (1 bottle) the evening before the colonoscopy, between 5 PM and 9 PM, followed by a second 175 mL bottle 5 hours before the colonoscopy.    spironolactone (ALDACTONE) 25 mg tablet Take 25 mg by mouth daily    tadalafil (CIALIS) 20 MG tablet TAKE 1 TABLET BY MOUTH IF NEEDED FOR ERECTILE DYSFUNCTION PRIOR TO SEX (Patient not taking: Reported on 8/27/2024)     Allergies   Allergen Reactions    Molds & Smuts Shortness Of Breath     Reaction Date: 03Jul2006;   Reaction Date: 03Jul2006;     Cat Hair Extract Other (See Comments)     Reaction Date: 03Jul2006;   Reaction Date: 03Jul2006;     Iodine - Food Allergy Diarrhea    Shellfish Allergy - Food Allergy GI Intolerance     Immunization History   Administered Date(s) Administered    COVID-19 MODERNA VACC 0.5 ML IM 11/22/2021    COVID-19 Pfizer Vac BIVALENT Rodney-sucrose 12 Yr+ IM 10/01/2022    Pneumococcal Conjugate Vaccine 20-valent (Pcv20), Polysace 03/06/2023    Tdap 03/06/2023    Tetanus, adsorbed 07/03/2006     Objective     /76 (BP Location: Left arm, Patient Position: Sitting, Cuff Size: Standard)   Pulse (!) 48   Temp (!) 97.2 °F (36.2 °C) (Tympanic)   Resp 16   Ht 6' 0.24\" (1.835 m)   Wt 86.5 kg (190 lb 12.8 oz)   SpO2 98%   BMI 25.71 kg/m²     Physical Exam  Vitals and nursing note reviewed.   Constitutional:       General: He is not in acute distress.     Appearance: Normal appearance.   HENT:      Head: Normocephalic " and atraumatic.      Right Ear: Tympanic membrane and external ear normal.      Left Ear: Tympanic membrane and external ear normal.      Nose: Nose normal.      Mouth/Throat:      Mouth: Mucous membranes are moist.   Eyes:      Extraocular Movements: Extraocular movements intact.      Conjunctiva/sclera: Conjunctivae normal.      Pupils: Pupils are equal, round, and reactive to light.   Cardiovascular:      Rate and Rhythm: Normal rate and regular rhythm.      Pulses: Normal pulses.      Heart sounds: Normal heart sounds. No murmur heard.  Pulmonary:      Effort: Pulmonary effort is normal.      Breath sounds: Normal breath sounds. No wheezing, rhonchi or rales.   Abdominal:      General: Bowel sounds are normal.      Palpations: Abdomen is soft.      Tenderness: There is no abdominal tenderness. There is no guarding.   Musculoskeletal:         General: Normal range of motion.      Cervical back: Normal range of motion.      Right lower leg: No edema.      Left lower leg: No edema.   Lymphadenopathy:      Cervical: No cervical adenopathy.   Skin:     General: Skin is warm.      Capillary Refill: Capillary refill takes less than 2 seconds.   Neurological:      General: No focal deficit present.      Mental Status: He is alert and oriented to person, place, and time.   Psychiatric:         Mood and Affect: Mood normal.         Behavior: Behavior normal.

## 2024-09-05 ENCOUNTER — TELEPHONE (OUTPATIENT)
Age: 64
End: 2024-09-05

## 2024-09-05 DIAGNOSIS — M25.562 ACUTE PAIN OF LEFT KNEE: Primary | ICD-10-CM

## 2024-09-05 DIAGNOSIS — M25.571 ACUTE RIGHT ANKLE PAIN: ICD-10-CM

## 2024-09-05 NOTE — TELEPHONE ENCOUNTER
Left detailed message for patient in regards to x-ray order and advised him to call office if he has any further questions or concerns.

## 2024-09-05 NOTE — TELEPHONE ENCOUNTER
Sukhdeep is calling to see if Dr. Blanco is able to order him xrays for his Left Knee and his Right Ankle. He states that Dr. Blanco evaluated both the knee and ankle at his ER follow up appointment on 8/27 after his fall.    Patient states that after 2 weeks - he is still having pain in both areas. His knee still looks like it has water in it and is still swollen. The ankle is still sore and has pain when he walks.     Please advise and notify patient, thank you!

## 2024-09-05 NOTE — TELEPHONE ENCOUNTER
Please give patient a call back and let him know that x-ray orders have been placed.  We will give him a call when we get the results.  Thank you.